# Patient Record
Sex: FEMALE | Race: OTHER
[De-identification: names, ages, dates, MRNs, and addresses within clinical notes are randomized per-mention and may not be internally consistent; named-entity substitution may affect disease eponyms.]

---

## 2017-04-15 ENCOUNTER — HOSPITAL ENCOUNTER (EMERGENCY)
Dept: HOSPITAL 8 - ED | Age: 22
End: 2017-04-15
Payer: SELF-PAY

## 2017-04-15 ENCOUNTER — HOSPITAL ENCOUNTER (EMERGENCY)
Dept: HOSPITAL 8 - ED | Age: 22
Discharge: HOME | End: 2017-04-15
Payer: COMMERCIAL

## 2017-04-15 VITALS — DIASTOLIC BLOOD PRESSURE: 54 MMHG | SYSTOLIC BLOOD PRESSURE: 110 MMHG

## 2017-04-15 VITALS — DIASTOLIC BLOOD PRESSURE: 74 MMHG | SYSTOLIC BLOOD PRESSURE: 109 MMHG

## 2017-04-15 VITALS — WEIGHT: 136.91 LBS | HEIGHT: 65 IN | BODY MASS INDEX: 22.81 KG/M2
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
## 2017-04-15 DIAGNOSIS — S16.1XXA: Primary | ICD-10-CM
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
## 2017-04-15 DIAGNOSIS — M54.2: Primary | ICD-10-CM

## 2017-04-15 PROCEDURE — 72050 X-RAY EXAM NECK SPINE 4/5VWS: CPT

## 2017-04-15 PROCEDURE — 99284 EMERGENCY DEPT VISIT MOD MDM: CPT

## 2017-04-15 PROCEDURE — 99283 EMERGENCY DEPT VISIT LOW MDM: CPT

## 2017-04-15 PROCEDURE — 96372 THER/PROPH/DIAG INJ SC/IM: CPT

## 2017-10-17 ENCOUNTER — NON-PROVIDER VISIT (OUTPATIENT)
Dept: URGENT CARE | Facility: CLINIC | Age: 22
End: 2017-10-17

## 2017-10-17 DIAGNOSIS — Z11.1 PPD SCREENING TEST: ICD-10-CM

## 2017-10-17 PROCEDURE — 86580 TB INTRADERMAL TEST: CPT | Performed by: PHYSICIAN ASSISTANT

## 2017-10-19 ENCOUNTER — NON-PROVIDER VISIT (OUTPATIENT)
Dept: URGENT CARE | Facility: CLINIC | Age: 22
End: 2017-10-19

## 2017-10-19 LAB — TB WHEAL 3D P 5 TU DIAM: NORMAL MM

## 2019-10-07 ENCOUNTER — NON-PROVIDER VISIT (OUTPATIENT)
Dept: OCCUPATIONAL MEDICINE | Facility: CLINIC | Age: 24
End: 2019-10-07

## 2019-10-07 DIAGNOSIS — Z11.1 PPD SCREENING TEST: ICD-10-CM

## 2019-10-07 PROCEDURE — 86580 TB INTRADERMAL TEST: CPT | Performed by: PHYSICIAN ASSISTANT

## 2019-10-09 ENCOUNTER — NON-PROVIDER VISIT (OUTPATIENT)
Dept: OCCUPATIONAL MEDICINE | Facility: CLINIC | Age: 24
End: 2019-10-09

## 2019-10-09 LAB — TB WHEAL 3D P 5 TU DIAM: NORMAL MM

## 2020-04-22 ENCOUNTER — HOSPITAL ENCOUNTER (EMERGENCY)
Dept: HOSPITAL 8 - ED | Age: 25
Discharge: HOME | End: 2020-04-22
Payer: MEDICAID

## 2020-04-22 VITALS — BODY MASS INDEX: 29.21 KG/M2 | WEIGHT: 158.73 LBS | HEIGHT: 62 IN

## 2020-04-22 VITALS — DIASTOLIC BLOOD PRESSURE: 76 MMHG | SYSTOLIC BLOOD PRESSURE: 117 MMHG

## 2020-04-22 DIAGNOSIS — Z20.828: ICD-10-CM

## 2020-04-22 DIAGNOSIS — R07.89: ICD-10-CM

## 2020-04-22 DIAGNOSIS — J00: Primary | ICD-10-CM

## 2020-04-22 DIAGNOSIS — R50.9: ICD-10-CM

## 2020-04-22 LAB
ALBUMIN SERPL-MCNC: 3.8 G/DL (ref 3.4–5)
ANION GAP SERPL CALC-SCNC: 4 MMOL/L (ref 5–15)
BASOPHILS # BLD AUTO: 0.05 X10^3/UL (ref 0–0.1)
BASOPHILS NFR BLD AUTO: 1 % (ref 0–1)
CALCIUM SERPL-MCNC: 9 MG/DL (ref 8.5–10.1)
CHLORIDE SERPL-SCNC: 109 MMOL/L (ref 98–107)
CREAT SERPL-MCNC: 0.62 MG/DL (ref 0.55–1.02)
EOSINOPHIL # BLD AUTO: 0.18 X10^3/UL (ref 0–0.4)
EOSINOPHIL NFR BLD AUTO: 2 % (ref 1–7)
ERYTHROCYTE [DISTWIDTH] IN BLOOD BY AUTOMATED COUNT: 12.9 % (ref 9.6–15.2)
LYMPHOCYTES # BLD AUTO: 2.33 X10^3/UL (ref 1–3.4)
LYMPHOCYTES NFR BLD AUTO: 24 % (ref 22–44)
MCH RBC QN AUTO: 30.3 PG (ref 27–34.8)
MCHC RBC AUTO-ENTMCNC: 33.9 G/DL (ref 32.4–35.8)
MCV RBC AUTO: 89.2 FL (ref 80–100)
MD: NO
MONOCYTES # BLD AUTO: 0.62 X10^3/UL (ref 0.2–0.8)
MONOCYTES NFR BLD AUTO: 7 % (ref 2–9)
NEUTROPHILS # BLD AUTO: 6.39 X10^3/UL (ref 1.8–6.8)
NEUTROPHILS NFR BLD AUTO: 67 % (ref 42–75)
PLATELET # BLD AUTO: 280 X10^3/UL (ref 130–400)
PMV BLD AUTO: 9.3 FL (ref 7.4–10.4)
RBC # BLD AUTO: 4.79 X10^6/UL (ref 3.82–5.3)

## 2020-04-22 PROCEDURE — 71045 X-RAY EXAM CHEST 1 VIEW: CPT

## 2020-04-22 PROCEDURE — 99285 EMERGENCY DEPT VISIT HI MDM: CPT

## 2020-04-22 PROCEDURE — 85025 COMPLETE CBC W/AUTO DIFF WBC: CPT

## 2020-04-22 PROCEDURE — 80048 BASIC METABOLIC PNL TOTAL CA: CPT

## 2020-04-22 PROCEDURE — 82040 ASSAY OF SERUM ALBUMIN: CPT

## 2020-04-22 PROCEDURE — 93005 ELECTROCARDIOGRAM TRACING: CPT

## 2020-04-22 PROCEDURE — 36415 COLL VENOUS BLD VENIPUNCTURE: CPT

## 2020-04-22 NOTE — NUR
THIS IS A 23 YO FEMALE COMING IN FOR "TIGHTNESS IN MY CHEST AND IT'S A LITTLE 
HARD TO CATCH MY BREATH". PATIENT STATES THESE SYMPTOMS STARTED YESTERDAY, THE 
PAST WEEK THOUGH HAS BEEN FEELING "FEVERY AND I HAD THE CHILLS AND A SORE 
THROAT". PATIENT DENIES ANY MEDICAL HX. LUNG SOUNDS CLEAR BUT MILDLY DIMINISHED 
THROUGHOUT. SPO2 AT 98% ON RA. NSR ON  CARDIAC MONITOR. ALL MONITORING IN 
PLACE. CALL LIGHT IN REACH

## 2020-04-22 NOTE — NUR
-------------------------------------------------------------------------------

          *** Note undone in [facility] - 04/22/20 at 2015 by JOSIAH ***          

-------------------------------------------------------------------------------

REENA HILL

## 2020-10-26 ENCOUNTER — HOSPITAL ENCOUNTER (EMERGENCY)
Dept: HOSPITAL 8 - ED | Age: 25
Discharge: HOME | End: 2020-10-26
Payer: MEDICAID

## 2020-10-26 VITALS — WEIGHT: 155.21 LBS | BODY MASS INDEX: 30.47 KG/M2 | HEIGHT: 60 IN

## 2020-10-26 VITALS — DIASTOLIC BLOOD PRESSURE: 65 MMHG | SYSTOLIC BLOOD PRESSURE: 112 MMHG

## 2020-10-26 DIAGNOSIS — R00.0: ICD-10-CM

## 2020-10-26 DIAGNOSIS — B34.9: ICD-10-CM

## 2020-10-26 DIAGNOSIS — R94.31: ICD-10-CM

## 2020-10-26 DIAGNOSIS — U07.1: Primary | ICD-10-CM

## 2020-10-26 LAB
ALBUMIN SERPL-MCNC: 3.9 G/DL (ref 3.4–5)
ANION GAP SERPL CALC-SCNC: 9 MMOL/L (ref 5–15)
BASOPHILS # BLD AUTO: 0 X10^3/UL (ref 0–0.1)
BASOPHILS NFR BLD AUTO: 1 % (ref 0–1)
CALCIUM SERPL-MCNC: 8.5 MG/DL (ref 8.5–10.1)
CHLORIDE SERPL-SCNC: 106 MMOL/L (ref 98–107)
CREAT SERPL-MCNC: 0.65 MG/DL (ref 0.55–1.02)
EOSINOPHIL # BLD AUTO: 0 X10^3/UL (ref 0–0.4)
EOSINOPHIL NFR BLD AUTO: 0 % (ref 1–7)
ERYTHROCYTE [DISTWIDTH] IN BLOOD BY AUTOMATED COUNT: 12.8 % (ref 9.6–15.2)
LYMPHOCYTES # BLD AUTO: 1 X10^3/UL (ref 1–3.4)
LYMPHOCYTES NFR BLD AUTO: 18 % (ref 22–44)
MCH RBC QN AUTO: 29.5 PG (ref 27–34.8)
MCHC RBC AUTO-ENTMCNC: 33.6 G/DL (ref 32.4–35.8)
MD: NO
MICROSCOPIC: (no result)
MONOCYTES # BLD AUTO: 0.4 X10^3/UL (ref 0.2–0.8)
MONOCYTES NFR BLD AUTO: 7 % (ref 2–9)
NEUTROPHILS # BLD AUTO: 4.1 X10^3/UL (ref 1.8–6.8)
NEUTROPHILS NFR BLD AUTO: 75 % (ref 42–75)
PLATELET # BLD AUTO: 174 X10^3/UL (ref 130–400)
PMV BLD AUTO: 9.3 FL (ref 7.4–10.4)
RBC # BLD AUTO: 4.94 X10^6/UL (ref 3.82–5.3)

## 2020-10-26 PROCEDURE — 85025 COMPLETE CBC W/AUTO DIFF WBC: CPT

## 2020-10-26 PROCEDURE — 87086 URINE CULTURE/COLONY COUNT: CPT

## 2020-10-26 PROCEDURE — 36415 COLL VENOUS BLD VENIPUNCTURE: CPT

## 2020-10-26 PROCEDURE — 80048 BASIC METABOLIC PNL TOTAL CA: CPT

## 2020-10-26 PROCEDURE — 96361 HYDRATE IV INFUSION ADD-ON: CPT

## 2020-10-26 PROCEDURE — 87635 SARS-COV-2 COVID-19 AMP PRB: CPT

## 2020-10-26 PROCEDURE — 84145 PROCALCITONIN (PCT): CPT

## 2020-10-26 PROCEDURE — 82040 ASSAY OF SERUM ALBUMIN: CPT

## 2020-10-26 PROCEDURE — 81001 URINALYSIS AUTO W/SCOPE: CPT

## 2020-10-26 PROCEDURE — 71045 X-RAY EXAM CHEST 1 VIEW: CPT

## 2020-10-26 PROCEDURE — 93005 ELECTROCARDIOGRAM TRACING: CPT

## 2020-10-26 PROCEDURE — 83605 ASSAY OF LACTIC ACID: CPT

## 2020-10-26 PROCEDURE — 96374 THER/PROPH/DIAG INJ IV PUSH: CPT

## 2020-10-26 PROCEDURE — 99285 EMERGENCY DEPT VISIT HI MDM: CPT

## 2020-10-26 PROCEDURE — 87040 BLOOD CULTURE FOR BACTERIA: CPT

## 2020-10-27 ENCOUNTER — HOSPITAL ENCOUNTER (EMERGENCY)
Dept: HOSPITAL 8 - ED | Age: 25
LOS: 1 days | Discharge: HOME | End: 2020-10-28
Payer: MEDICAID

## 2020-10-27 VITALS — BODY MASS INDEX: 30.04 KG/M2 | HEIGHT: 60 IN | WEIGHT: 153 LBS

## 2020-10-27 VITALS — SYSTOLIC BLOOD PRESSURE: 109 MMHG | DIASTOLIC BLOOD PRESSURE: 63 MMHG

## 2020-10-27 DIAGNOSIS — R00.0: ICD-10-CM

## 2020-10-27 DIAGNOSIS — R07.89: ICD-10-CM

## 2020-10-27 DIAGNOSIS — J15.9: Primary | ICD-10-CM

## 2020-10-27 DIAGNOSIS — R06.02: ICD-10-CM

## 2020-10-27 DIAGNOSIS — J06.9: ICD-10-CM

## 2020-10-27 PROCEDURE — 93005 ELECTROCARDIOGRAM TRACING: CPT

## 2020-10-27 PROCEDURE — 87400 INFLUENZA A/B EACH AG IA: CPT

## 2020-10-27 PROCEDURE — 99285 EMERGENCY DEPT VISIT HI MDM: CPT

## 2020-10-27 PROCEDURE — 71045 X-RAY EXAM CHEST 1 VIEW: CPT

## 2021-02-02 ENCOUNTER — HOSPITAL ENCOUNTER (EMERGENCY)
Dept: HOSPITAL 8 - ED | Age: 26
Discharge: HOME | End: 2021-02-02
Payer: MEDICAID

## 2021-02-02 VITALS — HEIGHT: 64 IN | BODY MASS INDEX: 26.99 KG/M2 | WEIGHT: 158.07 LBS

## 2021-02-02 VITALS — DIASTOLIC BLOOD PRESSURE: 78 MMHG | SYSTOLIC BLOOD PRESSURE: 128 MMHG

## 2021-02-02 DIAGNOSIS — M54.12: Primary | ICD-10-CM

## 2021-02-02 DIAGNOSIS — M25.531: ICD-10-CM

## 2021-02-02 DIAGNOSIS — M25.521: ICD-10-CM

## 2021-02-02 DIAGNOSIS — M25.511: ICD-10-CM

## 2021-02-02 DIAGNOSIS — M79.631: ICD-10-CM

## 2021-02-02 PROCEDURE — 99283 EMERGENCY DEPT VISIT LOW MDM: CPT

## 2021-02-02 NOTE — NUR
pt states tingling up and down right arm. pt states tingling started in hand 
and overnight it was the whole arm. denies headache, neck pain. states having 
some shoulder pain.

## 2021-03-04 ENCOUNTER — HOSPITAL ENCOUNTER (OUTPATIENT)
Dept: LAB | Facility: MEDICAL CENTER | Age: 26
End: 2021-03-04
Attending: INTERNAL MEDICINE
Payer: MEDICAID

## 2021-03-04 DIAGNOSIS — R53.83 OTHER FATIGUE: ICD-10-CM

## 2021-03-04 DIAGNOSIS — M25.50 POLYARTHRALGIA: ICD-10-CM

## 2021-03-04 DIAGNOSIS — R76.8 POSITIVE ANA (ANTINUCLEAR ANTIBODY): ICD-10-CM

## 2021-03-04 LAB
25(OH)D3 SERPL-MCNC: 18 NG/ML (ref 30–100)
ALBUMIN SERPL BCP-MCNC: 4.7 G/DL (ref 3.2–4.9)
ALBUMIN/GLOB SERPL: 1.5 G/DL
ALP SERPL-CCNC: 101 U/L (ref 30–99)
ALT SERPL-CCNC: 21 U/L (ref 2–50)
ANION GAP SERPL CALC-SCNC: 10 MMOL/L (ref 7–16)
AST SERPL-CCNC: 23 U/L (ref 12–45)
BASOPHILS # BLD AUTO: 0.4 % (ref 0–1.8)
BASOPHILS # BLD: 0.03 K/UL (ref 0–0.12)
BILIRUB SERPL-MCNC: 0.5 MG/DL (ref 0.1–1.5)
BUN SERPL-MCNC: 9 MG/DL (ref 8–22)
C3 SERPL-MCNC: 159.5 MG/DL (ref 87–200)
C4 SERPL-MCNC: 20.6 MG/DL (ref 19–52)
CALCIUM SERPL-MCNC: 9.7 MG/DL (ref 8.5–10.5)
CHLORIDE SERPL-SCNC: 108 MMOL/L (ref 96–112)
CO2 SERPL-SCNC: 24 MMOL/L (ref 20–33)
CREAT SERPL-MCNC: 0.5 MG/DL (ref 0.5–1.4)
CRP SERPL HS-MCNC: 0.2 MG/DL (ref 0–0.75)
EOSINOPHIL # BLD AUTO: 0.11 K/UL (ref 0–0.51)
EOSINOPHIL NFR BLD: 1.5 % (ref 0–6.9)
ERYTHROCYTE [DISTWIDTH] IN BLOOD BY AUTOMATED COUNT: 40.8 FL (ref 35.9–50)
ERYTHROCYTE [SEDIMENTATION RATE] IN BLOOD BY WESTERGREN METHOD: 13 MM/HOUR (ref 0–20)
GLOBULIN SER CALC-MCNC: 3.1 G/DL (ref 1.9–3.5)
GLUCOSE SERPL-MCNC: 94 MG/DL (ref 65–99)
HCT VFR BLD AUTO: 46.6 % (ref 37–47)
HGB BLD-MCNC: 15.6 G/DL (ref 12–16)
IMM GRANULOCYTES # BLD AUTO: 0.01 K/UL (ref 0–0.11)
IMM GRANULOCYTES NFR BLD AUTO: 0.1 % (ref 0–0.9)
LYMPHOCYTES # BLD AUTO: 1.47 K/UL (ref 1–4.8)
LYMPHOCYTES NFR BLD: 20.3 % (ref 22–41)
MCH RBC QN AUTO: 29.8 PG (ref 27–33)
MCHC RBC AUTO-ENTMCNC: 33.5 G/DL (ref 33.6–35)
MCV RBC AUTO: 88.9 FL (ref 81.4–97.8)
MONOCYTES # BLD AUTO: 0.39 K/UL (ref 0–0.85)
MONOCYTES NFR BLD AUTO: 5.4 % (ref 0–13.4)
NEUTROPHILS # BLD AUTO: 5.23 K/UL (ref 2–7.15)
NEUTROPHILS NFR BLD: 72.3 % (ref 44–72)
NRBC # BLD AUTO: 0 K/UL
NRBC BLD-RTO: 0 /100 WBC
PLATELET # BLD AUTO: 303 K/UL (ref 164–446)
PMV BLD AUTO: 11.1 FL (ref 9–12.9)
POTASSIUM SERPL-SCNC: 3.8 MMOL/L (ref 3.6–5.5)
PROT SERPL-MCNC: 7.8 G/DL (ref 6–8.2)
RBC # BLD AUTO: 5.24 M/UL (ref 4.2–5.4)
SODIUM SERPL-SCNC: 142 MMOL/L (ref 135–145)
THYROPEROXIDASE AB SERPL-ACNC: <9 IU/ML (ref 0–9)
VIT B12 SERPL-MCNC: 637 PG/ML (ref 211–911)
WBC # BLD AUTO: 7.2 K/UL (ref 4.8–10.8)

## 2021-03-04 PROCEDURE — 80053 COMPREHEN METABOLIC PANEL: CPT

## 2021-03-04 PROCEDURE — 86038 ANTINUCLEAR ANTIBODIES: CPT

## 2021-03-04 PROCEDURE — 86140 C-REACTIVE PROTEIN: CPT

## 2021-03-04 PROCEDURE — 36415 COLL VENOUS BLD VENIPUNCTURE: CPT

## 2021-03-04 PROCEDURE — 86225 DNA ANTIBODY NATIVE: CPT

## 2021-03-04 PROCEDURE — 86800 THYROGLOBULIN ANTIBODY: CPT

## 2021-03-04 PROCEDURE — 82607 VITAMIN B-12: CPT

## 2021-03-04 PROCEDURE — 82306 VITAMIN D 25 HYDROXY: CPT

## 2021-03-04 PROCEDURE — 85652 RBC SED RATE AUTOMATED: CPT

## 2021-03-04 PROCEDURE — 83516 IMMUNOASSAY NONANTIBODY: CPT

## 2021-03-04 PROCEDURE — 86160 COMPLEMENT ANTIGEN: CPT

## 2021-03-04 PROCEDURE — 86376 MICROSOMAL ANTIBODY EACH: CPT

## 2021-03-04 PROCEDURE — 86812 HLA TYPING A B OR C: CPT

## 2021-03-04 PROCEDURE — 85025 COMPLETE CBC W/AUTO DIFF WBC: CPT

## 2021-03-04 PROCEDURE — 86200 CCP ANTIBODY: CPT

## 2021-03-06 LAB
CCP IGG SERPL-ACNC: 3 UNITS (ref 0–19)
DSDNA AB TITR SER CLIF: NORMAL {TITER}
HLA-B27 QL FC: NEGATIVE
NUCLEAR IGG SER QL IA: NORMAL
THYROGLOB AB SERPL-ACNC: <0.9 IU/ML (ref 0–4)
THYROPEROXIDASE AB SERPL-ACNC: 0.7 IU/ML (ref 0–9)

## 2021-03-08 LAB — CHROMATIN IGG SERPL-ACNC: 4 UNITS (ref 0–19)

## 2023-05-12 ENCOUNTER — OFFICE VISIT (OUTPATIENT)
Dept: RHEUMATOLOGY | Facility: MEDICAL CENTER | Age: 28
End: 2023-05-12
Attending: STUDENT IN AN ORGANIZED HEALTH CARE EDUCATION/TRAINING PROGRAM
Payer: COMMERCIAL

## 2023-05-12 VITALS
SYSTOLIC BLOOD PRESSURE: 122 MMHG | HEART RATE: 69 BPM | TEMPERATURE: 99.1 F | BODY MASS INDEX: 31.22 KG/M2 | OXYGEN SATURATION: 96 % | HEIGHT: 60 IN | DIASTOLIC BLOOD PRESSURE: 72 MMHG | WEIGHT: 159 LBS

## 2023-05-12 DIAGNOSIS — L40.0 PLAQUE PSORIASIS: ICD-10-CM

## 2023-05-12 DIAGNOSIS — L40.50 PSORIATIC ARTHRITIS (HCC): ICD-10-CM

## 2023-05-12 DIAGNOSIS — R76.8 SEROLOGIC AUTOIMMUNITY: ICD-10-CM

## 2023-05-12 DIAGNOSIS — R23.1 LIVEDO RETICULARIS WITHOUT ULCERATION: ICD-10-CM

## 2023-05-12 DIAGNOSIS — D84.9 IMMUNOSUPPRESSED STATUS (HCC): ICD-10-CM

## 2023-05-12 PROCEDURE — 3074F SYST BP LT 130 MM HG: CPT | Performed by: STUDENT IN AN ORGANIZED HEALTH CARE EDUCATION/TRAINING PROGRAM

## 2023-05-12 PROCEDURE — 3078F DIAST BP <80 MM HG: CPT | Performed by: STUDENT IN AN ORGANIZED HEALTH CARE EDUCATION/TRAINING PROGRAM

## 2023-05-12 PROCEDURE — 1125F AMNT PAIN NOTED PAIN PRSNT: CPT | Performed by: STUDENT IN AN ORGANIZED HEALTH CARE EDUCATION/TRAINING PROGRAM

## 2023-05-12 PROCEDURE — 99204 OFFICE O/P NEW MOD 45 MIN: CPT | Performed by: STUDENT IN AN ORGANIZED HEALTH CARE EDUCATION/TRAINING PROGRAM

## 2023-05-12 PROCEDURE — 99211 OFF/OP EST MAY X REQ PHY/QHP: CPT | Performed by: STUDENT IN AN ORGANIZED HEALTH CARE EDUCATION/TRAINING PROGRAM

## 2023-05-12 RX ORDER — CERTOLIZUMAB PEGOL 200 MG/ML
INJECTION, SOLUTION SUBCUTANEOUS
COMMUNITY

## 2023-05-12 RX ORDER — ONDANSETRON 2 MG/ML
INJECTION INTRAMUSCULAR; INTRAVENOUS
COMMUNITY
End: 2023-05-12

## 2023-05-12 ASSESSMENT — PATIENT HEALTH QUESTIONNAIRE - PHQ9
SUM OF ALL RESPONSES TO PHQ QUESTIONS 1-9: 5
5. POOR APPETITE OR OVEREATING: 0 - NOT AT ALL
CLINICAL INTERPRETATION OF PHQ2 SCORE: 2

## 2023-05-12 NOTE — PROGRESS NOTES
[facility] RHEUMATOLOGY  [address] [place] [address], [address], [place] [address]  Phone: [phone] ? Fax: [phone]    RHEUMATOLOGY NEW PATIENT VISIT NOTE      DATE OF SERVICE: 05/12/2023         Subjective     REFERRING PRACTITIONER:  Irene Hedrick M.D.  [address] [place] [address]    PATIENT IDENTIFICATION:  Abril Gutierrez  [address]  [place] [address]    YOB: 1995    MEDICAL RECORD NUMBER: [id number]         CHIEF COMPLAINT:   Chief Complaint   Patient presents with    New Patient     Psoriatic arthritis and positive FARHANA       HISTORY OF PRESENT ILLNESS:  Abril Gutierrez is a 27 y.o. female with pertinent history notable for psoriatic arthritis diagnosed in late 2022 (reportedly symptmatic since early 2021), plaque psoriasis since teenage years, hyperhidrosis since childhood, anxiety and depression. Presents to establish care for rheumatologic evaluation and management of her PsA in the setting of positive FARHANA with concern for other connective tissue disease. Reports variable course of symptoms including episodic pain/swelling of her hands, variable durations of morning stiffness, muscle pain with body aches, cold-induced color changes of fingers (turns red), and chronic sweaty palms and armpits due to her hyperhidrosis. She previously managed with analgesics until she was started on Cimzia which has been helpful for her skin and joints.    Pertinent treatment history: Cimzia 400 mg SC every 2 weeks (1/2023-present, effective).    Pertinent laboratory results: Positive FARHANA with anti-dsDNA 13, and low vitamin D of 22 (in 12/2022); negative/normal HLA-B27, anti-TPO, anti-TG, anti-chromatin, C3, C4, ESR and CRP (in 3/2021), QTB (in 1/2022), TSH, CBC, and CMP (in 12/2022).    Pertinent XR imaging in 4/2021: Hands and feet with no radiographic evidence of inflammatory or degenerative arthropathy.    REVIEW OF SYSTEMS:  Except as noted in the history above, relevant review of systems with emphasis on  autoimmune rheumatic diseases was otherwise negative.      ACTIVE PROBLEM LIST:  Patient Active Problem List    Diagnosis Date Noted    Psoriatic arthritis (HCC) 05/12/2023    Plaque psoriasis 05/12/2023    Livedo reticularis without ulceration 05/12/2023    Serologic autoimmunity (positive FARHANA) 05/12/2023    Immunosuppressed status (HCC) 05/12/2023       PAST MEDICAL HISTORY:  Past Medical History:   Diagnosis Date    Anxiety     Depression        PAST SURGICAL HISTORY:  Past Surgical History:   Procedure Laterality Date    APPENDECTOMY  2010       MEDICATIONS:  Current Outpatient Medications   Medication Sig    certolizumab pegol (CIMZIA STARTER KIT) 6 X 200 MG/ML Prefilled Syringe Kit Inject  under the skin.       ALLERGIES:   No Known Allergies    IMMUNIZATIONS:  Immunization History   Administered Date(s) Administered    Dtap Vaccine 1995, 01/13/1998, 03/13/1998, 12/02/1998, 09/06/2001    HIB Vaccine (ACTHIB/HIBERIX) 12/02/1998, 02/02/1999    Hepatitis A Vaccine, Ped/Adol 02/13/2002, 08/15/2002    Hepatitis B Vaccine Adolescent/Pediatric 12/02/1998, 02/02/1999, 06/02/1999    IPV 09/06/2001    MMR Vaccine 12/02/1998, 09/06/2001    Measles Vaccine - Historical Data 10/06/1998    OPV TRIVALENT - HISTORICAL DATA (GIVEN PRIOR TO MAY 2016) 1995, 01/13/1998, 03/13/1998    Tuberculin Skin Test 09/05/2016, 10/17/2017, 10/07/2019    Varicella Vaccine Live 02/11/2000       SOCIAL HISTORY:   Social History     Socioeconomic History    Marital status: Single   Tobacco Use    Smoking status: Never    Smokeless tobacco: Never   Vaping Use    Vaping Use: Never used   Substance and Sexual Activity    Alcohol use: Yes    Drug use: No       FAMILY HISTORY:  History reviewed. No pertinent family history.         Objective     Vital Signs: /72 (BP Location: Left arm, Patient Position: Sitting, BP Cuff Size: Adult)   Pulse 69   Temp 37.3 °C (99.1 °F) (Temporal)   Ht 1.524 m (5')   Wt 72.1 kg (159 lb)   SpO2  96% Body mass index is 31.05 kg/m².    General: Appears well and comfortable  Eyes: No scleral or conjunctival lesions  ENT: No apparent oral or nasal lesions  Head/Neck: Psoriasis plaques on the edge of scalp mostly on the occipital region  Cardiovascular: Regular rate and rhythm; no pericardial rubs  Respiratory: Breathing quiet and unlabored; no rales or pleural rubs  Gastrointestinal: No apparent organomegaly or abdominal masses  Integumentary: Diffuse livedo reticularis on extremities (lower more than upper); wet palms due to hyperhidrosis  Musculoskeletal: No significant joint tenderness, periarticular soft tissue swelling, warmth, erythema, or overt signs of synovitis; no significant restriction in range of motion of joints examined  Neurologic: No focal sensory or motor deficits  Psychiatric: Mood and affect appropriate      LABORATORY RESULTS REVIEWED AND INTERPRETED BY ME:  Lab Results   Component Value Date/Time    SEDRATEWES 13 03/04/2021 10:48 AM    CREACTPROT 0.20 03/04/2021 10:48 AM     Lab Results   Component Value Date/Time    X1UVLFBONRL 159.5 03/04/2021 10:48 AM    Q5EBGTLWFRP 20.6 03/04/2021 10:48 AM     Lab Results   Component Value Date/Time    CCPANTIBODY 3 03/04/2021 10:48 AM    [id number] Negative 03/04/2021 10:49 AM     Lab Results   Component Value Date/Time    ANTINUCAB None Detected 03/04/2021 10:49 AM     Lab Results   Component Value Date/Time    ANTIDNADS None Detected 03/04/2021 10:48 AM     Lab Results   Component Value Date/Time    MICROSOMALA 0.7 03/04/2021 10:48 AM    MICROSOMALA <9.0 03/04/2021 10:48 AM    ANTITHYROGL <0.9 03/04/2021 10:48 AM     Lab Results   Component Value Date/Time    25HYDROXY 18 (L) 03/04/2021 10:48 AM     Lab Results   Component Value Date/Time    PROTHROMBTM 11.7 04/26/2009 12:20 AM    INR 1.02 04/26/2009 12:20 AM     Lab Results   Component Value Date/Time    WBC 7.2 03/04/2021 10:48 AM    RBC 5.24 03/04/2021 10:48 AM    HEMOGLOBIN 15.6 03/04/2021  10:48 AM    HEMATOCRIT 46.6 03/04/2021 10:48 AM    MCV 88.9 03/04/2021 10:48 AM    MCH 29.8 03/04/2021 10:48 AM    MCHC 33.5 (L) 03/04/2021 10:48 AM    RDW 40.8 03/04/2021 10:48 AM    PLATELETCT 303 03/04/2021 10:48 AM    MPV 11.1 03/04/2021 10:48 AM    NEUTS 5.23 03/04/2021 10:48 AM    LYMPHOCYTES 20.30 (L) 03/04/2021 10:48 AM    MONOCYTES 5.40 03/04/2021 10:48 AM    EOSINOPHILS 1.50 03/04/2021 10:48 AM    BASOPHILS 0.40 03/04/2021 10:48 AM    HYPOCHROMIA 1+ 12/10/2012 11:00 AM     Lab Results   Component Value Date/Time    ASTSGOT 20 12/07/2022 07:27 AM    ASTSGOT 23 03/04/2021 10:48 AM    ALTSGPT 25 12/07/2022 07:27 AM    ALTSGPT 21 03/04/2021 10:48 AM    ALKPHOSPHAT 88 12/07/2022 07:27 AM    ALKPHOSPHAT 101 (H) 03/04/2021 10:48 AM    TBILIRUBIN 0.7 12/07/2022 07:27 AM    TBILIRUBIN 0.5 03/04/2021 10:48 AM    TOTPROTEIN 7.2 12/07/2022 07:27 AM    TOTPROTEIN 7.8 03/04/2021 10:48 AM    ALBUMIN 4.6 12/07/2022 07:27 AM    ALBUMIN 4.7 03/04/2021 10:48 AM     Lab Results   Component Value Date/Time    SODIUM 140 12/07/2022 07:27 AM    SODIUM 142 03/04/2021 10:48 AM    POTASSIUM 4.0 12/07/2022 07:27 AM    POTASSIUM 3.8 03/04/2021 10:48 AM    CHLORIDE 106 12/07/2022 07:27 AM    CHLORIDE 108 03/04/2021 10:48 AM    CO2 24.0 12/07/2022 07:27 AM    CO2 24 03/04/2021 10:48 AM    GLUCOSE 104 12/07/2022 07:27 AM    GLUCOSE 94 03/04/2021 10:48 AM    BUN 9.0 12/07/2022 07:27 AM    BUN 9 03/04/2021 10:48 AM    CREATININE 0.7 12/07/2022 07:27 AM    CREATININE 0.50 03/04/2021 10:48 AM    CREATININE 0.5 04/26/2009 12:20 AM    BUNCREATRAT 12.9 12/07/2022 07:27 AM    CALCIUM 9.5 12/07/2022 07:27 AM    CALCIUM 9.7 03/04/2021 10:48 AM     Lab Results   Component Value Date/Time    COLORURINE Lt. Yellow 12/10/2012 09:20 AM    SPECGRAVITY 1.016 12/10/2012 09:20 AM    PHURINE 5.5 12/10/2012 09:20 AM    GLUCOSEUR Negative 12/10/2012 09:20 AM    KETONES Negative 12/10/2012 09:20 AM    PROTEINURIN Negative 12/10/2012 09:20 AM     Lab Results    Component Value Date/Time    HBA1C 5.4 12/07/2022 07:27 AM       RADIOLOGY RESULTS REVIEWED AND INTERPRETED BY ME:  Results for orders placed during the hospital encounter of 04/07/21    DX-JOINT SURVEY-FEET SINGLE VIEW    Impression  Unremarkable bilateral PA view if the feet. No radiographic evidence for active inflammatory arthritis at this time.    This exam was performed in an orthopedic clinic of the [facility]. This interpretation is performed in addition to the interpretation by the ordering provider.    Results for orders placed during the hospital encounter of 04/07/21    DX-JOINT SURVEY-HANDS SINGLE VIEW    Impression  Unremarkable examination of hands. No radiographic evidence for active inflammatory arthritis at this time.    This exam was performed in an orthopedic clinic of the [facility]. This interpretation is performed in addition to the interpretation by the ordering provider.      All relevant laboratory and imaging results reported on this note were reviewed and interpreted by me.         Assessment & Plan     Abril Gutierrez is a 27 y.o. female with history as noted above whose presentation merits the following diagnostic and clinical status impressions and recommendations:    1. Serologic autoimmunity (positive FARHANA)  Serologic evidence of immunologic activity without objective clinical evidence of an underlying FARHANA-associated autoimmune disease, such as Sjogren syndrome, systemic lupus, inflammatory myopathy, or systemic sclerosis. Notably, the FARHANA test is highly sensitive and lacks diagnostic specificity as it can be seen in a variety of non-rheumatic conditions, such as acute or chronic viral or bacterial infections, autoimmune thyroid disease (Hashimoto's thyroiditis or Graves' disease), autoimmune hepatobiliary disease, inflammatory bowel disease, and lymphoproliferative disease or malignancy, as well as in over 10% of healthy individuals with no clinical evidence of  autoimmune rheumatic disease. In any case, it must be interpreted in the context of the overall clinical picture with close attention to disease-specific manifestations and disease-specific sub-antibodies. That said, the presence of persistently positive FARHANA, especially in high or rising titers, does confer some risk of FARHANA-associated disease, so if suspicious symptoms develop and persist, clinical follow-up for re-evaluation would be reasonable. For now, reasonable to undertake the additional workup noted below for confirmatory, exclusionary, and risk stratification purposes.  - FARHANA REFLEXIVE PROFILE; Future  - ANTI-HISTONE ABS; Future  - CHROMATIN AB,IGG; Future  - COMPLEMENT C3; Future  - COMPLEMENT C4; Future    2. 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 Psoriatic arthritis (HCC)  Clinically relatively low disease activity that appears to be well-controlled on the current regimen of Cimzia, so no need for modification of treatment.  - Continue Cimzia 400 mg SC every 2 weeks    4. Plaque psoriasis  Clinically appears to be responsive to Cimzia, though her peripheral scalp plaques are not completely cleared.  - Continue Cimzia 400 mg SC every 2 weeks    5. Immunosuppressed status (HCC)  Presently with no reported history, physical exam, or laboratory evidence to suggest significant adverse drug effects or opportunistic infections, but need routine monitoring per guidelines.  - Need to ascertain age-appropriate vaccines to include Shingrix      Note: The above assessment and plan were discussed with the patient who acknowledged understanding of the plan.    FOLLOW-UP: Return in about 2 months (around 7/12/2023) for Short.         Thank you for the opportunity to participate in the care of Abril Gutierrez.    John Goodwin MD, MS  Rheumatologist, [facility] ?  [facility]   of Clinical Medicine, Department of Internal Medicine  [facility], [employer] [facility]

## 2023-05-12 NOTE — PATIENT INSTRUCTIONS
AFTER VISIT INSTRUCTIONS    Below are important information to help you navigate your healthcare needs and help us serve you safely and effectively:  If laboratory tests and/or imaging studies were ordered, remember to go get them done as instructed.  If new prescriptions and/or refills were sent, remember to go pick them up from your local pharmacy, or call the specialty pharmacy to request shipment.  Always take your prescription medications exactly as prescribed unless instructed otherwise.  Note that antirheumatic drugs and steroids are immunosuppressive which means they increase your risk of infections and have multiple potential adverse effects on various organ systems in your body, though most of them are uncommon.  It is important that you are up-to-date on age-appropriate immunizations, particularly shingles and bacterial/viral pneumonia vaccines, which you can request from me or your primary care provider.  Be sure to read the drug package inserts to learn about the potential side effects of your medications before you start taking them.  If you experience any significant drug side effects, stop taking the medication and notify me promptly, and depending on the severity of the side effects, consider going to an urgent care or emergency department for immediate attention.  If there are significant findings on your lab tests and imaging studies that warrant further action, I will notify you with explanations via [employer] or phone call, otherwise you can view them on [employer] and let me know if you have any questions.  Note that [employer] messages are typically read during office hours and may take 1-7 business days before a response depending on the urgency of the situation and how busy my clinic schedule is.  In general, [employer] messaging is for non-urgent matters that do not require immediate attention, so for urgent matters that cannot wait, you are advised to go to an urgent care.  Lastly, you are granted  MyChart access to my documentation of your visit and are encouraged to read my note which details my assessment and plan for your condition.

## 2023-07-11 ENCOUNTER — HOSPITAL ENCOUNTER (OUTPATIENT)
Dept: LAB | Facility: MEDICAL CENTER | Age: 28
End: 2023-07-11
Attending: STUDENT IN AN ORGANIZED HEALTH CARE EDUCATION/TRAINING PROGRAM
Payer: COMMERCIAL

## 2023-07-11 DIAGNOSIS — R23.1 LIVEDO RETICULARIS WITHOUT ULCERATION: ICD-10-CM

## 2023-07-11 DIAGNOSIS — R76.8 SEROLOGIC AUTOIMMUNITY: ICD-10-CM

## 2023-07-11 LAB
C3 SERPL-MCNC: 124 MG/DL (ref 87–200)
C4 SERPL-MCNC: 14.3 MG/DL (ref 19–52)

## 2023-07-11 PROCEDURE — 86038 ANTINUCLEAR ANTIBODIES: CPT

## 2023-07-11 PROCEDURE — 86225 DNA ANTIBODY NATIVE: CPT

## 2023-07-11 PROCEDURE — 83516 IMMUNOASSAY NONANTIBODY: CPT

## 2023-07-11 PROCEDURE — 86235 NUCLEAR ANTIGEN ANTIBODY: CPT | Mod: 91

## 2023-07-11 PROCEDURE — 86039 ANTINUCLEAR ANTIBODIES (ANA): CPT

## 2023-07-11 PROCEDURE — 86160 COMPLEMENT ANTIGEN: CPT

## 2023-07-11 PROCEDURE — 36415 COLL VENOUS BLD VENIPUNCTURE: CPT

## 2023-07-11 PROCEDURE — 86256 FLUORESCENT ANTIBODY TITER: CPT

## 2023-07-13 ENCOUNTER — APPOINTMENT (OUTPATIENT)
Dept: RHEUMATOLOGY | Facility: MEDICAL CENTER | Age: 28
End: 2023-07-13
Attending: STUDENT IN AN ORGANIZED HEALTH CARE EDUCATION/TRAINING PROGRAM

## 2023-07-14 LAB
CHROMATIN IGG SERPL-ACNC: 23 UNITS (ref 0–19)
HISTONE IGG SER IA-ACNC: 0.7 UNITS (ref 0–0.9)
NUCLEAR IGG SER QL IA: DETECTED

## 2023-07-15 LAB
ANA INTERPRETIVE COMMENT Q5143: ABNORMAL
ANA PATTERN Q5144: ABNORMAL
ANA TITER Q5145: ABNORMAL
ANTINUCLEAR ANTIBODY (ANA), HEP-2, IGG Q5142: DETECTED

## 2023-07-16 LAB
DSDNA AB TITR SER CLIF: NORMAL {TITER}
ENA JO1 AB TITR SER: 1 AU/ML (ref 0–40)
ENA SCL70 IGG SER QL: 2 AU/ML (ref 0–40)
ENA SM IGG SER-ACNC: 2 AU/ML (ref 0–40)
ENA SS-B IGG SER IA-ACNC: 1 AU/ML (ref 0–40)
SSA52 R0ENA AB IGG Q0420: 6 AU/ML (ref 0–40)
SSA60 R0ENA AB IGG Q0419: 4 AU/ML (ref 0–40)
U1 SNRNP IGG SER QL: 4 UNITS (ref 0–19)

## 2023-07-17 LAB — DSDNA IGG TITR SER CLIF: ABNORMAL {TITER}

## 2023-10-10 ENCOUNTER — OFFICE VISIT (OUTPATIENT)
Dept: RHEUMATOLOGY | Facility: MEDICAL CENTER | Age: 28
End: 2023-10-10
Attending: STUDENT IN AN ORGANIZED HEALTH CARE EDUCATION/TRAINING PROGRAM
Payer: COMMERCIAL

## 2023-10-10 VITALS
TEMPERATURE: 99.1 F | BODY MASS INDEX: 29.64 KG/M2 | OXYGEN SATURATION: 94 % | HEART RATE: 84 BPM | DIASTOLIC BLOOD PRESSURE: 66 MMHG | HEIGHT: 60 IN | WEIGHT: 151 LBS | SYSTOLIC BLOOD PRESSURE: 116 MMHG

## 2023-10-10 DIAGNOSIS — D84.9 IMMUNOSUPPRESSED STATUS (HCC): ICD-10-CM

## 2023-10-10 DIAGNOSIS — L40.50 PSORIATIC ARTHRITIS (HCC): ICD-10-CM

## 2023-10-10 DIAGNOSIS — R76.8 SEROLOGIC AUTOIMMUNITY: ICD-10-CM

## 2023-10-10 DIAGNOSIS — L40.0 PLAQUE PSORIASIS: ICD-10-CM

## 2023-10-10 PROCEDURE — 99214 OFFICE O/P EST MOD 30 MIN: CPT | Performed by: STUDENT IN AN ORGANIZED HEALTH CARE EDUCATION/TRAINING PROGRAM

## 2023-10-10 PROCEDURE — 3078F DIAST BP <80 MM HG: CPT | Performed by: STUDENT IN AN ORGANIZED HEALTH CARE EDUCATION/TRAINING PROGRAM

## 2023-10-10 PROCEDURE — 99211 OFF/OP EST MAY X REQ PHY/QHP: CPT | Performed by: STUDENT IN AN ORGANIZED HEALTH CARE EDUCATION/TRAINING PROGRAM

## 2023-10-10 PROCEDURE — 3074F SYST BP LT 130 MM HG: CPT | Performed by: STUDENT IN AN ORGANIZED HEALTH CARE EDUCATION/TRAINING PROGRAM

## 2023-10-10 RX ORDER — ETONOGESTREL 68 MG/1
IMPLANT SUBCUTANEOUS
COMMUNITY

## 2023-10-10 ASSESSMENT — FIBROSIS 4 INDEX: FIB4 SCORE: 0.48

## 2023-10-10 NOTE — PROGRESS NOTES
[facility] RHEUMATOLOGY  [address], [address], [place] [address]  Phone: [phone] ? Fax: [phone]  Website: [email or web address]    RHEUMATOLOGY FOLLOW-UP VISIT NOTE      DATE OF SERVICE: 10/10/2023         Subjective     PRIMARY CARE PRACTITIONER:  Renetta Black P.A.-C.  [address]  [place] [address]    PATIENT IDENTIFICATION:  Abril Gutierrez  [address]  [place] [address]    YOB: 1995    MEDICAL RECORD NUMBER: [id number]          CHIEF COMPLAINT:   Chief Complaint   Patient presents with    Follow-Up     Serologic autoimmunity (positive FARHANA)       RHEUMATOLOGIC HISTORY:  Abril Gutierrez is a 28 y.o. female with pertinent history notable for psoriatic arthritis diagnosed in late 2022 (reportedly symptmatic since early 2021), plaque psoriasis since teenage years (confirmed on skin biopsy of right occipital scalp in 12/2022), hyperhidrosis since childhood s/p VATS sympathectomy in 10/2023 at [facility], livedo reticularis, anxiety and depression among other comorbidities. She initially presented on 5/12/23 to establish care for rheumatologic evaluation and management of her PsA in the setting of positive FARHANA with concern for other connective tissue disease. Reported variable course of symptoms including episodic pain/swelling of her hands, variable durations of morning stiffness, muscle pain with body aches, cold-induced color changes of fingers (turns red), and chronic sweaty palms and armpits due to her hyperhidrosis. She previously managed with analgesics until she was started on Cimzia which had been helpful for her skin and joints.     Pertinent treatment history: Cimzia 400>200 mg SC every 2 weeks (1/2023-present, effective).     Pertinent laboratory results: Positive FARHANA 1:1280 homogenous pattern, anti-chromatin 23, and anti-dsDNA 119 at 1:80 titer with negative anti-histone (in 7/2023); low C4 of 14.3 with normal C3 of 124 (in 7/2023); low vitamin D  of 22 (in 12/2022); negative/normal HLA-B27, anti-TPO, anti-TG, CRP and ESR (in 3/2021), QTB (in 1/2022), TSH (in 12/2022), CBC and CMP (in 9/2023).    Pertinent XR imaging results: Hands (in 4/2021) and feet (in 4/2021) with no radiographic evidence of inflammatory or degenerative arthropathy.      INTERVAL HISTORY:  Reports interval history as noted on the questionnaire below or scanned under media tab.  Mild episodic joint pain/swelling/stiffness in her hands, wrists, and ankles.  Hyperhidrosis better after VATS sympathectomy surgery on 10/4/23 at [facility].  Doing quite well otherwise and feels that Cimzia has been helpful.    REVIEW OF SYSTEMS:  Except as noted in the history above, relevant review of systems with emphasis on autoimmune rheumatic diseases was otherwise negative.      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    certolizumab pegol (CIMZIA STARTER KIT) 6 X 200 MG/ML Prefilled Syringe Kit Inject  under the skin.       ALLERGIES:   No Known Allergies    IMMUNIZATIONS:  Immunization History   Administered Date(s) Administered    Dtap Vaccine 1995, 01/13/1998, 03/13/1998, 12/02/1998, 09/06/2001    HIB Vaccine (ACTHIB/HIBERIX) 12/02/1998, 02/02/1999    Hepatitis A Vaccine, Ped/Adol 02/13/2002, 08/15/2002    Hepatitis B Vaccine Adolescent/Pediatric 12/02/1998, 02/02/1999,  06/02/1999    IPV 09/06/2001    MMR Vaccine 12/02/1998, 09/06/2001    MODERNA SARS-COV-2 VACCINE (12+) 03/11/2021, 04/08/2021, 11/29/2021    Measles Vaccine - Historical Data 10/06/1998    OPV TRIVALENT - HISTORICAL DATA (GIVEN PRIOR TO MAY 2016) 1997, 01/13/1998, 03/13/1998    Tuberculin Skin Test 09/05/2016, 10/17/2017, 10/07/2019    Varicella Vaccine Live 02/11/2000       SOCIAL HISTORY:   Social History     Socioeconomic History    Marital status: Single   Tobacco Use    Smoking status: Never    Smokeless tobacco: Never   Vaping Use    Vaping Use: Never used   Substance and Sexual Activity    Alcohol use: Yes    Drug use: No       FAMILY HISTORY:  History reviewed. No pertinent family history.         Objective     Vital Signs: /66 (BP Location: Left arm, Patient Position: Sitting, BP Cuff Size: Adult)   Pulse 84   Temp 37.3 °C (99.1 °F) (Temporal)   Ht 1.524 m (5')   Wt 68.5 kg (151 lb)   SpO2 94% Body mass index is 29.49 kg/m².    General: Appears well and comfortable  Eyes: No scleral or conjunctival lesions  ENT: No apparent oral or nasal lesions  Head/Neck: Trace psoriasis plaques on the edge of occipital scalp   Cardiovascular: Regular rate and rhythm  Respiratory: Breathing quiet and unlabored  Gastrointestinal: No apparent organomegaly or abdominal masses  Integumentary: Minimal diffuse livedo reticularis on lower extremities more than upper extremities  Musculoskeletal: No significant joint tenderness, periarticular soft tissue swelling, warmth, erythema, or overt synovitis; no significant restriction in range of motion of joints examined  Neurologic: No focal sensory or motor deficits  Psychiatric: Mood and affect appropriate      LABORATORY RESULTS REVIEWED AND INTERPRETED BY ME:  Lab Results   Component Value Date/Time    CREACTPROT 0.20 03/04/2021 10:48 AM    SEDRATEWES 13 03/04/2021 10:48 AM     Lab Results   Component Value Date/Time    U7UNSKKDYJI 124.0 07/11/2023 05:41 PM     U6DIHZAGJWW 14.3 (L) 07/11/2023 05:41 PM     Lab Results   Component Value Date/Time    CCPANTIBODY 3 03/04/2021 10:48 AM    RUMQ81FAIO Negative 03/04/2021 10:49 AM     Lab Results   Component Value Date/Time    ANTINUCAB Detected (A) 07/11/2023 05:41 PM     Lab Results   Component Value Date/Time    ANTIDNADS SEE BELOW 07/11/2023 05:41 PM    SMITHAB 2 07/11/2023 05:41 PM    RNPAB 4 07/11/2023 05:41 PM    LJYSEXI41 2 07/11/2023 05:41 PM    SSA60 4 07/11/2023 05:41 PM    SSA52 6 07/11/2023 05:41 PM    ANTISSBSJ 1 07/11/2023 05:41 PM    JO1AB 1 07/11/2023 05:41 PM     Lab Results   Component Value Date/Time    MICROSOMALA 0.7 03/04/2021 10:48 AM    MICROSOMALA <9.0 03/04/2021 10:48 AM    ANTITHYROGL <0.9 03/04/2021 10:48 AM     Lab Results   Component Value Date/Time    25HYDROXY 18 (L) 03/04/2021 10:48 AM     Lab Results   Component Value Date/Time    PROTHROMBTM 11.7 04/26/2009 12:20 AM    INR 1.02 04/26/2009 12:20 AM     Lab Results   Component Value Date/Time    WBC 10.20 (H) 09/20/2023 07:28 PM    WBC 7.2 03/04/2021 10:48 AM    RBC 4.93 09/20/2023 07:28 PM    RBC 5.24 03/04/2021 10:48 AM    HEMOGLOBIN 15.1 09/20/2023 07:28 PM    HEMOGLOBIN 15.6 03/04/2021 10:48 AM    HEMATOCRIT 43.4 09/20/2023 07:28 PM    HEMATOCRIT 46.6 03/04/2021 10:48 AM    MCV 88.0 09/20/2023 07:28 PM    MCV 88.9 03/04/2021 10:48 AM    MCH 30.6 09/20/2023 07:28 PM    MCH 29.8 03/04/2021 10:48 AM    MCHC 34.8 09/20/2023 07:28 PM    MCHC 33.5 (L) 03/04/2021 10:48 AM    RDW 13.2 09/20/2023 07:28 PM    RDW 40.8 03/04/2021 10:48 AM    PLATELETCT 278 09/20/2023 07:28 PM    PLATELETCT 303 03/04/2021 10:48 AM    MPV 9.2 09/20/2023 07:28 PM    MPV 11.1 03/04/2021 10:48 AM    NEUTS 6.7 09/20/2023 07:28 PM    NEUTS 5.23 03/04/2021 10:48 AM    LYMPHOCYTES 27.0 09/20/2023 07:28 PM    LYMPHOCYTES 20.30 (L) 03/04/2021 10:48 AM    MONOCYTES 6.1 09/20/2023 07:28 PM    MONOCYTES 5.40 03/04/2021 10:48 AM    EOSINOPHILS 0.4 09/20/2023 07:28 PM    EOSINOPHILS  1.50 03/04/2021 10:48 AM    BASOPHILS 0.7 09/20/2023 07:28 PM    BASOPHILS 0.40 03/04/2021 10:48 AM    HYPOCHROMIA 1+ 12/10/2012 11:00 AM     Lab Results   Component Value Date/Time    ASTSGOT 21 09/20/2023 07:28 PM    ASTSGOT 23 03/04/2021 10:48 AM    ALTSGPT 17 09/20/2023 07:28 PM    ALTSGPT 21 03/04/2021 10:48 AM    ALKPHOSPHAT 82 09/20/2023 07:28 PM    ALKPHOSPHAT 101 (H) 03/04/2021 10:48 AM    TBILIRUBIN 0.7 09/20/2023 07:28 PM    TBILIRUBIN 0.5 03/04/2021 10:48 AM    TOTPROTEIN 8.5 (H) 09/20/2023 07:28 PM    TOTPROTEIN 7.8 03/04/2021 10:48 AM    ALBUMIN 5.3 (H) 09/20/2023 07:28 PM    ALBUMIN 4.7 03/04/2021 10:48 AM     Lab Results   Component Value Date/Time    SODIUM 142 09/20/2023 07:28 PM    SODIUM 142 03/04/2021 10:48 AM    POTASSIUM 3.4 (L) 09/20/2023 07:28 PM    POTASSIUM 3.8 03/04/2021 10:48 AM    CHLORIDE 108 09/20/2023 07:28 PM    CHLORIDE 108 03/04/2021 10:48 AM    CO2 25.0 09/20/2023 07:28 PM    CO2 24 03/04/2021 10:48 AM    GLUCOSE 88 09/20/2023 07:28 PM    GLUCOSE 94 03/04/2021 10:48 AM    BUN 7.0 09/20/2023 07:28 PM    BUN 9 03/04/2021 10:48 AM    CREATININE 0.7 09/20/2023 07:28 PM    CREATININE 0.50 03/04/2021 10:48 AM    CREATININE 0.5 04/26/2009 12:20 AM    BUNCREATRAT 10.0 09/20/2023 07:28 PM    CALCIUM 10.0 09/20/2023 07:28 PM    CALCIUM 9.7 03/04/2021 10:48 AM     Lab Results   Component Value Date/Time    COLORURINE Lt. Yellow 12/10/2012 09:20 AM    SPECGRAVITY 1.016 12/10/2012 09:20 AM    PHURINE 5.5 12/10/2012 09:20 AM    GLUCOSEUR Negative 12/10/2012 09:20 AM    KETONES Negative 12/10/2012 09:20 AM    PROTEINURIN Negative 12/10/2012 09:20 AM     Lab Results   Component Value Date/Time    HBA1C 5.4 12/07/2022 07:27 AM       RADIOLOGY RESULTS REVIEWED AND INTERPRETED BY ME:  Results for orders placed during the hospital encounter of 04/07/21    DX-JOINT SURVEY-FEET SINGLE VIEW    Impression  Unremarkable bilateral PA view if the feet. No radiographic evidence for active inflammatory  arthritis at this time.    This exam was performed in an orthopedic clinic of the [facility]. This interpretation is performed in addition to the interpretation by the ordering provider.    Results for orders placed during the hospital encounter of 04/07/21    DX-JOINT SURVEY-HANDS SINGLE VIEW    Impression  Unremarkable examination of hands. No radiographic evidence for active inflammatory arthritis at this time.    This exam was performed in an orthopedic clinic of the [facility]. This interpretation is performed in addition to the interpretation by the ordering provider.      All relevant laboratory and imaging results reported on this note were reviewed and interpreted by me.         Assessment & Plan     Abril Gutierrez is a 28 y.o. female with history as noted above whose presentation merits the following diagnostic and clinical status impressions and recommendations:    1. Psoriatic arthritis (HCC)  Clinically relatively low disease activity that appears to be well-controlled on the current regimen of Cimzia, so no need for modification of treatment.  - CRP QUANTITIVE (NON-CARDIAC); Future  - Sed Rate; Future  - Cimzia 200 mg SC every 2 weeks    2. Plaque psoriasis  Clinically relatively low disease activity that appears to be well-controlled on the current regimen of Cimzia, so no need for modification of treatment.  - CRP QUANTITIVE (NON-CARDIAC); Future  - Sed Rate; Future  - Cimzia 200 mg SC every 2 weeks    3. 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 Raises some degree of suspicion for TNF-alpha inhibitor-induced autoimmunity, but seems unlikely as her FARHANA was initially positive in 12/2022 prior to initiation of Cimzia in 1/2023.  - CRP QUANTITIVE (NON-CARDIAC); Future  - Sed Rate; Future    4. Immunosuppressed status  (HCC)  Presently with no history, physical, or laboratory evidence to suggest significant adverse drug effects or opportunistic infections, but need routine monitoring per guidelines.  - CBC WITH DIFFERENTIAL; Future  - Comp Metabolic Panel; Future  - Need to ascertain age-appropriate vaccines in addition to the ones listed above under immunizations      The above assessment and plan were discussed with the patient who acknowledged understanding of the plan.    FOLLOW-UP: Return in about 6 months (around 4/10/2024) for Short.         Thank you for the opportunity to participate in the care of Abril Gutierrez.    John Goodwin MD, MS  Rheumatologist, [facility] Rheumatology ? [facility]   of Clinical Medicine, Department of Internal Medicine  [facility] ? [facility]

## 2023-10-11 NOTE — PATIENT INSTRUCTIONS
AFTER VISIT INSTRUCTIONS    Below are important information to help you navigate your healthcare needs and help us serve you safely and effectively:  If laboratory tests and/or imaging studies were ordered, remember to go get them done as instructed.  If new prescriptions and/or refills were sent, remember to go pick them up from your local pharmacy, or call the specialty pharmacy to request shipment.  Always take your prescription medications exactly as prescribed unless instructed otherwise.  Note that antirheumatic drugs and steroids are immunosuppressive which means they increase your risk of infections and have multiple potential adverse effects on various organ systems in your body, though most of them are uncommon.  It is important that you are up-to-date on age-appropriate immunizations, particularly shingles and bacterial/viral pneumonia vaccines, which you can request from me or your primary care provider.  Be sure to read the drug package inserts to learn about the potential side effects of your medications before you start taking them.  If you experience any significant drug side effects, stop taking the medication and notify me promptly, and depending on the severity of the side effects, consider going to an urgent care or emergency department for immediate attention.  If there are significant findings on your lab tests and imaging studies that warrant further action, I will notify you with explanations via [email or web address] or phone call, otherwise you can view them on [employer] and let me know if you have any questions.  Note that [employer] messages are typically read during office hours and may take 1-7 business days before a response depending on the urgency of the situation and how busy my clinic schedule is.  In general, [employer] messaging is for non-urgent matters that do not require immediate attention, so for urgent matters that cannot wait, you are advised to go to an urgent care.  Lastly, you are granted  Trentt access to my documentation of your visit and are encouraged to read my note which details my assessment and plan for your condition.  To learn more about your condition and rheumatic diseases evaluated and treated by rheumatologists, as well as gain access to many helpful resources about these diseases, visit our website at [email or web address].

## 2024-03-20 ENCOUNTER — HOSPITAL ENCOUNTER (OUTPATIENT)
Facility: MEDICAL CENTER | Age: 29
End: 2024-03-20
Attending: NURSE PRACTITIONER
Payer: COMMERCIAL

## 2024-03-20 ENCOUNTER — EH NON-PROVIDER (OUTPATIENT)
Dept: OCCUPATIONAL MEDICINE | Facility: CLINIC | Age: 29
End: 2024-03-20

## 2024-03-20 DIAGNOSIS — Z02.89 ENCOUNTER FOR OCCUPATIONAL HEALTH EXAMINATION: Primary | ICD-10-CM

## 2024-03-20 DIAGNOSIS — Z02.89 ENCOUNTER FOR OCCUPATIONAL HEALTH EXAMINATION: ICD-10-CM

## 2024-03-20 LAB
AMP AMPHETAMINE: ABNORMAL
BAR BARBITURATES: ABNORMAL
BZO BENZODIAZEPINES: ABNORMAL
COC COCAINE: ABNORMAL
INT CON NEG: ABNORMAL
INT CON POS: ABNORMAL
MDMA ECSTASY: ABNORMAL
MET METHAMPHETAMINES: ABNORMAL
MTD METHADONE: ABNORMAL
OPI OPIATES: ABNORMAL
OXY OXYCODONE: ABNORMAL
PCP PHENCYCLIDINE: ABNORMAL
POC URINE DRUG SCREEN OCDRS: ABNORMAL
THC: ABNORMAL

## 2024-03-20 PROCEDURE — 80305 DRUG TEST PRSMV DIR OPT OBS: CPT | Performed by: NURSE PRACTITIONER

## 2024-03-20 PROCEDURE — 86480 TB TEST CELL IMMUN MEASURE: CPT | Performed by: NURSE PRACTITIONER

## 2024-03-24 LAB
GAMMA INTERFERON BACKGROUND BLD IA-ACNC: 0.05 IU/ML
M TB IFN-G BLD-IMP: NEGATIVE
M TB IFN-G CD4+ BCKGRND COR BLD-ACNC: 0.01 IU/ML
MITOGEN IGNF BCKGRD COR BLD-ACNC: >10 IU/ML
QFT TB2 - NIL TBQ2: 0.02 IU/ML

## 2024-05-23 ENCOUNTER — HOSPITAL ENCOUNTER (OUTPATIENT)
Dept: LAB | Facility: MEDICAL CENTER | Age: 29
End: 2024-05-23
Attending: STUDENT IN AN ORGANIZED HEALTH CARE EDUCATION/TRAINING PROGRAM
Payer: COMMERCIAL

## 2024-05-23 DIAGNOSIS — L40.0 PLAQUE PSORIASIS: ICD-10-CM

## 2024-05-23 DIAGNOSIS — R76.8 SEROLOGIC AUTOIMMUNITY: ICD-10-CM

## 2024-05-23 DIAGNOSIS — L40.50 PSORIATIC ARTHRITIS (HCC): ICD-10-CM

## 2024-05-23 DIAGNOSIS — D84.9 IMMUNOSUPPRESSED STATUS (HCC): ICD-10-CM

## 2024-05-24 LAB
ALBUMIN SERPL BCP-MCNC: 4.2 G/DL (ref 3.2–4.9)
ALBUMIN/GLOB SERPL: 1.3 G/DL
ALP SERPL-CCNC: 84 U/L (ref 30–99)
ALT SERPL-CCNC: 24 U/L (ref 2–50)
ANION GAP SERPL CALC-SCNC: 12 MMOL/L (ref 7–16)
AST SERPL-CCNC: 22 U/L (ref 12–45)
BILIRUB SERPL-MCNC: 0.3 MG/DL (ref 0.1–1.5)
BUN SERPL-MCNC: 12 MG/DL (ref 8–22)
CALCIUM ALBUM COR SERPL-MCNC: 9 MG/DL (ref 8.5–10.5)
CALCIUM SERPL-MCNC: 9.2 MG/DL (ref 8.5–10.5)
CHLORIDE SERPL-SCNC: 104 MMOL/L (ref 96–112)
CO2 SERPL-SCNC: 24 MMOL/L (ref 20–33)
CREAT SERPL-MCNC: 0.54 MG/DL (ref 0.5–1.4)
CRP SERPL HS-MCNC: 0.44 MG/DL (ref 0–0.75)
GFR SERPLBLD CREATININE-BSD FMLA CKD-EPI: 130 ML/MIN/1.73 M 2
GLOBULIN SER CALC-MCNC: 3.2 G/DL (ref 1.9–3.5)
GLUCOSE SERPL-MCNC: 101 MG/DL (ref 65–99)
POTASSIUM SERPL-SCNC: 3.7 MMOL/L (ref 3.6–5.5)
PROT SERPL-MCNC: 7.4 G/DL (ref 6–8.2)
SODIUM SERPL-SCNC: 140 MMOL/L (ref 135–145)

## 2024-06-20 ENCOUNTER — OFFICE VISIT (OUTPATIENT)
Dept: RHEUMATOLOGY | Facility: MEDICAL CENTER | Age: 29
End: 2024-06-20
Attending: STUDENT IN AN ORGANIZED HEALTH CARE EDUCATION/TRAINING PROGRAM
Payer: COMMERCIAL

## 2024-06-20 VITALS
BODY MASS INDEX: 30.27 KG/M2 | TEMPERATURE: 98.2 F | OXYGEN SATURATION: 97 % | SYSTOLIC BLOOD PRESSURE: 106 MMHG | DIASTOLIC BLOOD PRESSURE: 72 MMHG | WEIGHT: 155 LBS | HEART RATE: 65 BPM

## 2024-06-20 DIAGNOSIS — E55.9 VITAMIN D INSUFFICIENCY: ICD-10-CM

## 2024-06-20 DIAGNOSIS — L40.50 PSORIATIC ARTHRITIS (HCC): ICD-10-CM

## 2024-06-20 DIAGNOSIS — L40.0 PLAQUE PSORIASIS: ICD-10-CM

## 2024-06-20 DIAGNOSIS — R76.8 SEROLOGIC AUTOIMMUNITY: ICD-10-CM

## 2024-06-20 DIAGNOSIS — D84.9 IMMUNOSUPPRESSED STATUS (HCC): ICD-10-CM

## 2024-06-20 PROCEDURE — 3074F SYST BP LT 130 MM HG: CPT | Performed by: STUDENT IN AN ORGANIZED HEALTH CARE EDUCATION/TRAINING PROGRAM

## 2024-06-20 PROCEDURE — 3078F DIAST BP <80 MM HG: CPT | Performed by: STUDENT IN AN ORGANIZED HEALTH CARE EDUCATION/TRAINING PROGRAM

## 2024-06-20 PROCEDURE — 99214 OFFICE O/P EST MOD 30 MIN: CPT | Performed by: STUDENT IN AN ORGANIZED HEALTH CARE EDUCATION/TRAINING PROGRAM

## 2024-06-20 PROCEDURE — 99212 OFFICE O/P EST SF 10 MIN: CPT | Performed by: STUDENT IN AN ORGANIZED HEALTH CARE EDUCATION/TRAINING PROGRAM

## 2024-06-20 RX ORDER — FLUOXETINE HYDROCHLORIDE 40 MG/1
40 CAPSULE ORAL DAILY
COMMUNITY

## 2024-06-20 RX ORDER — CERTOLIZUMAB PEGOL 200 MG/ML
200 INJECTION, SOLUTION SUBCUTANEOUS
Qty: 2 ML | Refills: 5 | Status: SHIPPED | OUTPATIENT
Start: 2024-06-20

## 2024-06-20 ASSESSMENT — PATIENT HEALTH QUESTIONNAIRE - PHQ9
5. POOR APPETITE OR OVEREATING: 2 - MORE THAN HALF THE DAYS
SUM OF ALL RESPONSES TO PHQ QUESTIONS 1-9: 19
CLINICAL INTERPRETATION OF PHQ2 SCORE: 6

## 2024-06-20 ASSESSMENT — FIBROSIS 4 INDEX: FIB4 SCORE: 0.42

## 2024-06-20 NOTE — PATIENT INSTRUCTIONS
[facility] AFTER [facility]    Below are important guidelines to help you navigate your health care needs and assist us in caring for you safely and effectively. We encourage you to carefully read and understand this information and adhere to them accordingly.    [employer] Messaging and Phone Calls:  Diagnosis and Treatment - For a detailed explanation of your condition and treatment plan from today's visit, refer to the visit note on [employer] via the following steps:  Log in to [employer] and click on “Visits” at the top.  Scroll down to “Past Visits” under Appointments.  Click on “View Notes” under the appropriate visit date.  Questions or Concerns - MyChart messaging is for non-urgent matters that do not require immediate attention and should be brief with no more than two questions or concerns. If you have multiple questions or concerns, we ask that you schedule an appointment to have them properly addressed.  Response to Messages - [employer] messages are addressed throughout the week depending on clinical availability, so we ask that you allow up to one week for a response.  Phone Calls and Voicemails - Phone calls and voicemail messages are reserved for time-sensitive matters that cannot wait to be addressed via [employer]. We ask that you refrain from calling the office multiple times or leaving multiple voicemails regarding the same issue as doing so may lead to delays in response time.  Urgent Issues - For urgent medical matters or medical emergencies that cannot wait, you are advised to go to your nearest Urgent Care or Emergency Department for immediate attention.    Laboratory Tests and Imaging Studies:  Future Lab and Imaging Orders - We ask that you get your lab tests and imaging studies done no later than one week before your follow-up visit unless instructed otherwise.  Results Communication - You may see some test results marked as “abnormal” that are not necessarily significant or concerning. If  there are significant abnormalities on your test results that warrant further action, you will be notified via MyChart or phone call, otherwise they will be addressed at your follow-up visit.    Prescriptions and Refill Requests:  General Prescriptions (e.g. prednisone, hydroxychloroquine, leflunomide, methotrexate, etc.) - These are sent to Retail Pharmacies, so all refill requests of these medications should be directed to your local pharmacy.  Specialty Prescriptions (e.g. Enbrel, Humira, Cosentyx, Xeljanz, etc.) - These are sent to Specialty Pharmacies, so all refill requests of these medications should be directed to your designated specialty pharmacy.  Infusion Prescriptions (e.g. Remicade, Simponi Aria, Rituxan, Saphnelo, etc.) - These are sent to Outpatient Infusion Centers, so all scheduling requests of these medications should be directed to your local infusion center.    Medication Risks and Adverse Effects:  Immunosuppressed Status - Steroids and antirheumatic drugs are immunosuppressants, so they increase the risk of infections and can have side effects on various organ systems in your body, though most of them are uncommon.  Potential Side Effects - Be sure to read the drug package inserts to learn about the potential side effects of your medications before you start taking them and take them exactly as prescribed unless instructed otherwise.  In Case of Side Effects - If you experience any significant side effects, stop taking the medication immediately and promptly notify the prescriber. Depending on the severity of the side effects, consider going to an Urgent Care or Emergency Department for immediate attention.    Immunizations and Health Screening:  Vaccinations - If you are on immunosuppressive therapy, it is important that you are up to date on age-appropriate immunizations, particularly shingles and pneumonia vaccines, which you can request from your primary care provider or from us at your  next appointment.  Screening Tests - It is also important that you are up to date on age-appropriate screening tests, such as pap smear, mammography, and colonoscopy, which you can request from your primary care provider.    Educational and Supportive Resources:  [facility] ([email or web address]) - Visit our website to learn more about your condition and other rheumatic diseases, and gain access to many helpful resources for them.  Disposal of Old Medications (www.miguel.gov/everyday-takeback-day) - Visit the Drug Enforcement Administration website to find a nearby location where you can properly dispose of old medications you no longer need.  Disposal of Used [place] (www.safeneedledisposal.org) - Visit the Safe Needle Disposal Organization website to find a nearby location where you can properly dispose of used needles from your injectable medications.

## 2024-06-20 NOTE — PROGRESS NOTES
[facility] RHEUMATOLOGY  [address] [facility], [address], [place] [address]  Phone: [phone] ? Fax: [phone]  [facility]/Health-Services/Rheumatology    FOLLOW-UP VISIT NOTE      DATE OF SERVICE: 06/20/2024         Subjective     PRIMARY CARE PRACTITIONER:  Renetta Black P.A.-C.  [address] [place] [id number]    PATIENT IDENTIFICATION:  Abril Bennett  [address] [place] [address]    YOB: 1995    MEDICAL RECORD NUMBER: [id number]          CHIEF COMPLAINT:   Chief Complaint   Patient presents with    Follow-Up     Psoriatic arthritis (HCC)       RHEUMATOLOGIC HISTORY:  Abril Bennett is a 28 y.o. female with pertinent history notable for psoriatic arthritis diagnosed in late 2022 (reportedly symptmatic since early 2021), plaque psoriasis since teenage years (confirmed on skin biopsy of right occipital scalp in 12/2022), hyperhidrosis since childhood s/p VATS sympathectomy in 10/2023 at [facility], livedo reticularis, anxiety and depression among other comorbidities. She initially presented on 5/12/23 to establish care for rheumatologic evaluation and management of her PsA in the setting of positive FARHANA with concern for other connective tissue disease. Reported variable course of symptoms including episodic pain/swelling of her hands, variable durations of morning stiffness, muscle pain with body aches, cold-induced red color changes of fingers, and chronic sweaty palms and armpits due to her hyperhidrosis. She previously managed with analgesics until she was started on Cimzia which had been helpful for her skin and joints.     Pertinent treatments: Cimzia 400>200 mg SC every 2 weeks (1/2023-present, effective).     Pertinent positive labs: Positive FARHANA 1:1280 homogenous pattern, anti-dsDNA 119 at 1:80 titer, and anti-chromatin 23 with negative anti-histone (in 7/2023); low C4 of 14.3 with normal C3 of 124 (in 7/2023); low vitamin D of 22 (in 12/2022).    Pertinent  negative labs: Negative HLA-B27, anti-TPO and anti-TG (in 3/2021), TSH (in 12/2022), QTB (in 3/2024), CRP, eGFR, creatinine, and LFTs (in 5/2024).     Pertinent XR imaging: Hands and feet (in 4/2021) with no radiographic evidence of inflammatory or degenerative arthropathy.       INTERVAL HISTORY:  Reports interval history as noted on the questionnaire below or scanned under media tab.  Notably since few weeks ago, somewhat increasing pain in the hands, elbows, knees, and ankles.  Variable degrees of morning stiffness that improves with activity and worsening pain on physical activity.  Some fatigue with body aches but otherwise generally doing well.    REVIEW OF SYSTEMS:  Except as noted in the history above, relevant review of systems with emphasis on autoimmune rheumatic diseases was otherwise negative.      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) 05/12/2023    Immunosuppressed status (HCC) 05/12/2023       PAST MEDICAL HISTORY:  Past Medical History:   Diagnosis Date    Anxiety     Depression        PAST SURGICAL HISTORY:  Past Surgical History:   Procedure Laterality Date    APPENDECTOMY  2010       SOCIAL HISTORY:  Social History     Socioeconomic History    Marital status: Single     Spouse name: Not on file    Number of children: Not on file    Years of education: Not on file    Highest education level: Not on file   Occupational History    Not on file   Tobacco Use    Smoking status: Never    Smokeless tobacco: Never   Vaping Use    Vaping status: Never Used   Substance and Sexual Activity    Alcohol use: Yes    Drug use: No    Sexual activity: Not on file   Other Topics Concern    Not on file   Social History Narrative    Not on file      Social Determinants of Health     Financial Resource Strain: Not on file   Food Insecurity: Not on file   Transportation Needs: Not on file   Physical Activity: Not on file   Stress: Not on file   Social Connections: Not on file   Intimate Partner Violence: Not on file   Housing Stability: Not on file       FAMILY HISTORY:  No family history on file.    MEDICATIONS:  Current Outpatient Medications   Medication Sig    PROZAC 40 MG capsule Take 40 mg by mouth every day.    certolizumab pegol (CIMZIA, 2 SYRINGE,) 200 MG/ML Prefilled Syringe Kit Inject 200 mg under the skin every 14 days.    etonogestrel (NEXPLANON) 68 MG Implant implant Inject  under the skin.       ALLERGIES:   No Known Allergies    IMMUNIZATIONS:  Immunization History   Administered Date(s) Administered    9VHPV VACCINE 2-3 DOSE IM (GARDASIL 9) 08/06/2015, 08/06/2015    Dtap Vaccine 1995, 01/13/1998, 03/13/1998, 12/02/1998, 09/06/2001    HIB Vaccine (ACTHIB/HIBERIX) 12/02/1998, 02/02/1999    HPV Quadrivalent Vaccine (GARDASIL) - HISTORICAL DATA 05/05/2011, 05/05/2011    Hepatitis A Vaccine, Ped/Adol 02/13/2002, 08/15/2002    Hepatitis B Vaccine Adolescent/Pediatric 12/02/1998, 02/02/1999, 06/02/1999    IPV 09/06/2001    Influenza (IM) Preservative Free - HISTORICAL DATA 03/08/2011    Influenza Seasonal Injectable - Historical Data 02/22/2011, 03/08/2011, 11/08/2013, 10/17/2014, 10/15/2015, 10/25/2016    Influenza Vaccine H1N1 - HISTORICAL DATA 11/07/2009    Influenza Vaccine Quad Inj (Pf) 12/18/2018, 09/30/2020, 10/14/2021, 10/05/2022    Influenza Vaccine Quad Inj (Preserved) 10/18/2017    MENING VAC SERO B 2 DOSE SCHED IM (BEXSERO) 01/11/2018    MMR Vaccine 12/02/1998, 09/06/2001    MODERNA SARS-COV-2 VACCINE (12+) 03/11/2021, 04/08/2021, 11/29/2021    Measles Vaccine - Historical Data 10/06/1998    Meningococcal Conjugate Vaccine MCV4 (Menactra) 09/10/2009, 08/06/2015, 08/06/2015    OPV TRIVALENT - HISTORICAL DATA (GIVEN PRIOR TO MAY  2016) 1995, 01/13/1998, 03/13/1998    Tdap Vaccine 09/10/2009, 09/03/2019, 09/03/2019    Tuberculin Skin Test 09/05/2016, 10/17/2017, 10/07/2019    Varicella Vaccine Live 02/11/2000, 09/10/2009            Objective     Vital Signs: /72 (BP Location: Left arm, Patient Position: Sitting, BP Cuff Size: Adult)   Pulse 65   Temp 36.8 °C (98.2 °F) (Temporal)   Wt 70.3 kg (155 lb)   SpO2 97% Body mass index is 30.27 kg/m².    General: Appears well and comfortable  Eyes: No scleral or conjunctival lesions  ENT: No apparent oral, nasal, or ear lesions  Head/Neck: No apparent scalp or neck lesions  Cardiovascular: Regular rate and rhythm  Respiratory: Breathing quiet and unlabored  Gastrointestinal: No apparent organomegaly or abdominal masses  Integumentary: No significant cutaneous lesions or dyspigmentation  Musculoskeletal: No significant joint tenderness, periarticular soft tissue swelling, warmth, erythema, or overt synovitis; no significant restriction in range of motion of joints examined  Neurologic: No focal sensory or motor deficits  Psychiatric: Mood and affect appropriate      LABORATORY RESULTS REVIEWED AND INTERPRETED BY ME:  Lab Results   Component Value Date/Time    CREACTPROT 0.44 05/23/2024 05:59 PM    SEDRATEWES 13 03/04/2021 10:48 AM     Lab Results   Component Value Date/Time    N4DHDLYOPEX 124.0 07/11/2023 05:41 PM    R6GSIITOQTQ 14.3 (L) 07/11/2023 05:41 PM     Lab Results   Component Value Date/Time    CCPANTIBODY 3 03/04/2021 10:48 AM    INVW31DESX Negative 03/04/2021 10:49 AM     Lab Results   Component Value Date/Time    ANTINUCAB Detected (A) 07/11/2023 05:41 PM     Lab Results   Component Value Date/Time    ANTIDNADS SEE BELOW 07/11/2023 05:41 PM    SMITHAB 2 07/11/2023 05:41 PM    RNPAB 4 07/11/2023 05:41 PM    STGTTGN54 2 07/11/2023 05:41 PM    SSA60 4 07/11/2023 05:41 PM    SSA52 6 07/11/2023 05:41 PM    ANTISSBSJ 1 07/11/2023 05:41 PM    JO1AB 1 07/11/2023 05:41 PM     Lab  Results   Component Value Date/Time    MICROSOMALA 0.7 03/04/2021 10:48 AM    MICROSOMALA <9.0 03/04/2021 10:48 AM    ANTITHYROGL <0.9 03/04/2021 10:48 AM     Lab Results   Component Value Date/Time    25HYDROXY 18 (L) 03/04/2021 10:48 AM     Lab Results   Component Value Date/Time    PROTHROMBTM 11.7 04/26/2009 12:20 AM    INR 1.02 04/26/2009 12:20 AM     Lab Results   Component Value Date/Time    WBC 10.20 (H) 09/20/2023 07:28 PM    WBC 7.2 03/04/2021 10:48 AM    RBC 4.93 09/20/2023 07:28 PM    RBC 5.24 03/04/2021 10:48 AM    HEMOGLOBIN 15.1 09/20/2023 07:28 PM    HEMOGLOBIN 15.6 03/04/2021 10:48 AM    HEMATOCRIT 43.4 09/20/2023 07:28 PM    HEMATOCRIT 46.6 03/04/2021 10:48 AM    MCV 88.0 09/20/2023 07:28 PM    MCV 88.9 03/04/2021 10:48 AM    MCH 30.6 09/20/2023 07:28 PM    MCH 29.8 03/04/2021 10:48 AM    MCHC 34.8 09/20/2023 07:28 PM    MCHC 33.5 (L) 03/04/2021 10:48 AM    RDW 13.2 09/20/2023 07:28 PM    RDW 40.8 03/04/2021 10:48 AM    PLATELETCT 278 09/20/2023 07:28 PM    PLATELETCT 303 03/04/2021 10:48 AM    MPV 9.2 09/20/2023 07:28 PM    MPV 11.1 03/04/2021 10:48 AM    NEUTS 6.7 09/20/2023 07:28 PM    NEUTS 5.23 03/04/2021 10:48 AM    LYMPHOCYTES 27.0 09/20/2023 07:28 PM    LYMPHOCYTES 20.30 (L) 03/04/2021 10:48 AM    MONOCYTES 6.1 09/20/2023 07:28 PM    MONOCYTES 5.40 03/04/2021 10:48 AM    EOSINOPHILS 0.4 09/20/2023 07:28 PM    EOSINOPHILS 1.50 03/04/2021 10:48 AM    BASOPHILS 0.7 09/20/2023 07:28 PM    BASOPHILS 0.40 03/04/2021 10:48 AM    HYPOCHROMIA 1+ 12/10/2012 11:00 AM     Lab Results   Component Value Date/Time    ASTSGOT 22 05/23/2024 05:59 PM    ALTSGPT 24 05/23/2024 05:59 PM    ALKPHOSPHAT 84 05/23/2024 05:59 PM    TBILIRUBIN 0.3 05/23/2024 05:59 PM    TOTPROTEIN 7.4 05/23/2024 05:59 PM    ALBUMIN 4.2 05/23/2024 05:59 PM     Lab Results   Component Value Date/Time    SODIUM 140 05/23/2024 05:59 PM    POTASSIUM 3.7 05/23/2024 05:59 PM    CHLORIDE 104 05/23/2024 05:59 PM    CO2 24 05/23/2024 05:59 PM     GLUCOSE 101 (H) 05/23/2024 05:59 PM    BUN 12 05/23/2024 05:59 PM    CREATININE 0.54 05/23/2024 05:59 PM    CREATININE 0.5 04/26/2009 12:20 AM    BUNCREATRAT 10.0 09/20/2023 07:28 PM    CALCIUM 9.2 05/23/2024 05:59 PM     Lab Results   Component Value Date/Time    COLORURINE Lt. Yellow 12/10/2012 09:20 AM    SPECGRAVITY 1.016 12/10/2012 09:20 AM    PHURINE 5.5 12/10/2012 09:20 AM    GLUCOSEUR Negative 12/10/2012 09:20 AM    KETONES Negative 12/10/2012 09:20 AM    PROTEINURIN Negative 12/10/2012 09:20 AM     Lab Results   Component Value Date/Time    HBA1C 5.4 12/07/2022 07:27 AM       RADIOLOGY RESULTS REVIEWED AND INTERPRETED BY ME:  Results for orders placed during the hospital encounter of 04/07/21    DX-JOINT SURVEY-FEET SINGLE VIEW    Impression  Unremarkable bilateral PA view if the feet. No radiographic evidence for active inflammatory arthritis at this time.    This exam was performed in an orthopedic clinic of the [facility]. This interpretation is performed in addition to the interpretation by the ordering provider.    Results for orders placed during the hospital encounter of 04/07/21    DX-JOINT SURVEY-HANDS SINGLE VIEW    Impression  Unremarkable examination of hands. No radiographic evidence for active inflammatory arthritis at this time.    This exam was performed in an orthopedic clinic of the [facility]. This interpretation is performed in addition to the interpretation by the ordering provider.      All relevant laboratory and imaging results reported on this note were reviewed and interpreted by me.         Assessment & Plan     Abril Bennett is a 28 y.o. female with history and physical as noted above whose presentation merits the following clinical impressions and recommendations:    1. Psoriatic arthritis (HCC)  Clinically low disease activity that appears to remain well-controlled on the current regimen of Cimzia, so no need for modification of treatment.  -  CRP QUANTITIVE (NON-CARDIAC); Future  - Sed Rate; Future  - certolizumab pegol (CIMZIA, 2 SYRINGE,) 200 MG/ML Prefilled Syringe Kit; Inject 200 mg under the skin every 14 days.  Dispense: 2 mL; Refill: 5     2. Plaque psoriasis  Clinically low disease activity that appears to remain well-controlled on the current regimen of Cimzia, so no need for modification of treatment.  - certolizumab pegol (CIMZIA, 2 SYRINGE,) 200 MG/ML Prefilled Syringe Kit; Inject 200 mg under the skin every 14 days.  Dispense: 2 mL; Refill: 5     3. 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 Initially raised some suspicion for TNF-alpha inhibitor-induced autoimmunity, but seems unlikely as her FARHANA was initially positive in 12/2022 prior to initiation of Cimzia in 1/2023.  - DSDNA AB, IGG W/RFLX TO IFA TITER; Future  - COMPLEMENT C3; Future  - COMPLEMENT C4; Future  - COMPLEMENT TOTAL (CH50); Future  - Consider hydroxychloroquine depending on her clinical trajectory    4. Vitamin D insufficiency  Hypovitaminosis D or deficiency can contribute to diffuse musculoskeletal aches, fatigue, malaise, and risk of decrease in bone density predisposing to osteopenia, so need supplementation and follow-up retesting.  - VITAMIN D,25 HYDROXY (DEFICIENCY); Future  - Recommend vitamin D 0395-7500 IU daily    5. Immunosuppressed status (HCC)  Presently with no history, physical, or laboratory evidence to suggest significant adverse drug effects or opportunistic infections, but need routine monitoring per guidelines.  - CBC WITH DIFFERENTIAL; Future  - Comp Metabolic Panel; Future  - Need to ascertain age-appropriate vaccines in addition to the ones listed above under immunizations      The above assessment and plan were discussed with the patient who acknowledged understanding  of the plan.    FOLLOW-UP: Return in about 4 months (around 10/20/2024) for Short.         Thank you for the opportunity to participate in the care of Abril Bennett.    John Goodwin MD, MS, FACR  Rheumatologist, [facility]ology, [facility]   of Clinical Medicine, Department of Internal Medicine  [facility]

## 2024-07-01 ENCOUNTER — APPOINTMENT (OUTPATIENT)
Dept: URGENT CARE | Facility: CLINIC | Age: 29
End: 2024-07-01
Payer: COMMERCIAL

## 2024-10-14 ENCOUNTER — HOSPITAL ENCOUNTER (OUTPATIENT)
Dept: LAB | Facility: MEDICAL CENTER | Age: 29
End: 2024-10-14
Attending: STUDENT IN AN ORGANIZED HEALTH CARE EDUCATION/TRAINING PROGRAM
Payer: COMMERCIAL

## 2024-10-14 DIAGNOSIS — L40.50 PSORIATIC ARTHRITIS (HCC): ICD-10-CM

## 2024-10-14 DIAGNOSIS — R76.8 SEROLOGIC AUTOIMMUNITY: ICD-10-CM

## 2024-10-14 DIAGNOSIS — E55.9 VITAMIN D INSUFFICIENCY: ICD-10-CM

## 2024-10-14 DIAGNOSIS — D84.9 IMMUNOSUPPRESSED STATUS (HCC): ICD-10-CM

## 2024-10-14 LAB
25(OH)D3 SERPL-MCNC: 18 NG/ML (ref 30–100)
ALBUMIN SERPL BCP-MCNC: 4 G/DL (ref 3.2–4.9)
ALBUMIN/GLOB SERPL: 1.3 G/DL
ALP SERPL-CCNC: 82 U/L (ref 30–99)
ALT SERPL-CCNC: 22 U/L (ref 2–50)
ANION GAP SERPL CALC-SCNC: 14 MMOL/L (ref 7–16)
AST SERPL-CCNC: 25 U/L (ref 12–45)
BASOPHILS # BLD AUTO: 0.4 % (ref 0–1.8)
BASOPHILS # BLD: 0.03 K/UL (ref 0–0.12)
BILIRUB SERPL-MCNC: 0.3 MG/DL (ref 0.1–1.5)
BUN SERPL-MCNC: 15 MG/DL (ref 8–22)
C3 SERPL-MCNC: 109.4 MG/DL (ref 87–200)
C4 SERPL-MCNC: 13.8 MG/DL (ref 19–52)
CALCIUM ALBUM COR SERPL-MCNC: 9.2 MG/DL (ref 8.5–10.5)
CALCIUM SERPL-MCNC: 9.2 MG/DL (ref 8.5–10.5)
CHLORIDE SERPL-SCNC: 102 MMOL/L (ref 96–112)
CO2 SERPL-SCNC: 23 MMOL/L (ref 20–33)
CREAT SERPL-MCNC: 0.56 MG/DL (ref 0.5–1.4)
CRP SERPL HS-MCNC: 1.02 MG/DL (ref 0–0.75)
EOSINOPHIL # BLD AUTO: 0.1 K/UL (ref 0–0.51)
EOSINOPHIL NFR BLD: 1.2 % (ref 0–6.9)
ERYTHROCYTE [DISTWIDTH] IN BLOOD BY AUTOMATED COUNT: 42.6 FL (ref 35.9–50)
ERYTHROCYTE [SEDIMENTATION RATE] IN BLOOD BY WESTERGREN METHOD: 12 MM/HOUR (ref 0–25)
GFR SERPLBLD CREATININE-BSD FMLA CKD-EPI: 128 ML/MIN/1.73 M 2
GLOBULIN SER CALC-MCNC: 3 G/DL (ref 1.9–3.5)
GLUCOSE SERPL-MCNC: 77 MG/DL (ref 65–99)
HCT VFR BLD AUTO: 41.3 % (ref 37–47)
HGB BLD-MCNC: 13.6 G/DL (ref 12–16)
IMM GRANULOCYTES # BLD AUTO: 0.03 K/UL (ref 0–0.11)
IMM GRANULOCYTES NFR BLD AUTO: 0.4 % (ref 0–0.9)
LYMPHOCYTES # BLD AUTO: 2.93 K/UL (ref 1–4.8)
LYMPHOCYTES NFR BLD: 35.7 % (ref 22–41)
MCH RBC QN AUTO: 29.8 PG (ref 27–33)
MCHC RBC AUTO-ENTMCNC: 32.9 G/DL (ref 32.2–35.5)
MCV RBC AUTO: 90.6 FL (ref 81.4–97.8)
MONOCYTES # BLD AUTO: 0.52 K/UL (ref 0–0.85)
MONOCYTES NFR BLD AUTO: 6.3 % (ref 0–13.4)
NEUTROPHILS # BLD AUTO: 4.6 K/UL (ref 1.82–7.42)
NEUTROPHILS NFR BLD: 56 % (ref 44–72)
NRBC # BLD AUTO: 0 K/UL
NRBC BLD-RTO: 0 /100 WBC (ref 0–0.2)
PLATELET # BLD AUTO: 290 K/UL (ref 164–446)
PMV BLD AUTO: 11 FL (ref 9–12.9)
POTASSIUM SERPL-SCNC: 3.8 MMOL/L (ref 3.6–5.5)
PROT SERPL-MCNC: 7 G/DL (ref 6–8.2)
RBC # BLD AUTO: 4.56 M/UL (ref 4.2–5.4)
SODIUM SERPL-SCNC: 139 MMOL/L (ref 135–145)
WBC # BLD AUTO: 8.2 K/UL (ref 4.8–10.8)

## 2024-10-14 PROCEDURE — 36415 COLL VENOUS BLD VENIPUNCTURE: CPT

## 2024-10-14 PROCEDURE — 85025 COMPLETE CBC W/AUTO DIFF WBC: CPT

## 2024-10-14 PROCEDURE — 86256 FLUORESCENT ANTIBODY TITER: CPT

## 2024-10-14 PROCEDURE — 80053 COMPREHEN METABOLIC PANEL: CPT

## 2024-10-14 PROCEDURE — 86162 COMPLEMENT TOTAL (CH50): CPT

## 2024-10-14 PROCEDURE — 82306 VITAMIN D 25 HYDROXY: CPT

## 2024-10-14 PROCEDURE — 86160 COMPLEMENT ANTIGEN: CPT | Mod: 91

## 2024-10-14 PROCEDURE — 86140 C-REACTIVE PROTEIN: CPT

## 2024-10-14 PROCEDURE — 86225 DNA ANTIBODY NATIVE: CPT

## 2024-10-14 PROCEDURE — 85652 RBC SED RATE AUTOMATED: CPT

## 2024-10-16 LAB
CH50 SERPL-ACNC: 46.9 U/ML (ref 38.7–89.9)
DSDNA AB TITR SER CLIF: NORMAL {TITER}

## 2024-10-22 ENCOUNTER — OFFICE VISIT (OUTPATIENT)
Dept: RHEUMATOLOGY | Facility: MEDICAL CENTER | Age: 29
End: 2024-10-22
Attending: STUDENT IN AN ORGANIZED HEALTH CARE EDUCATION/TRAINING PROGRAM
Payer: COMMERCIAL

## 2024-10-22 VITALS
BODY MASS INDEX: 31.64 KG/M2 | SYSTOLIC BLOOD PRESSURE: 116 MMHG | HEART RATE: 65 BPM | HEIGHT: 60 IN | DIASTOLIC BLOOD PRESSURE: 78 MMHG | OXYGEN SATURATION: 95 % | TEMPERATURE: 98.5 F | WEIGHT: 161.13 LBS

## 2024-10-22 DIAGNOSIS — E55.9 VITAMIN D DEFICIENCY: ICD-10-CM

## 2024-10-22 DIAGNOSIS — L40.0 PLAQUE PSORIASIS: ICD-10-CM

## 2024-10-22 DIAGNOSIS — R76.8 SEROLOGIC AUTOIMMUNITY: ICD-10-CM

## 2024-10-22 DIAGNOSIS — L40.50 PSORIATIC ARTHRITIS (HCC): ICD-10-CM

## 2024-10-22 DIAGNOSIS — D84.9 IMMUNOSUPPRESSED STATUS (HCC): ICD-10-CM

## 2024-10-22 LAB — DSDNA IGG TITR SER CLIF: ABNORMAL {TITER}

## 2024-10-22 PROCEDURE — 99214 OFFICE O/P EST MOD 30 MIN: CPT | Performed by: STUDENT IN AN ORGANIZED HEALTH CARE EDUCATION/TRAINING PROGRAM

## 2024-10-22 PROCEDURE — 99212 OFFICE O/P EST SF 10 MIN: CPT | Performed by: STUDENT IN AN ORGANIZED HEALTH CARE EDUCATION/TRAINING PROGRAM

## 2024-10-22 PROCEDURE — 3074F SYST BP LT 130 MM HG: CPT | Performed by: STUDENT IN AN ORGANIZED HEALTH CARE EDUCATION/TRAINING PROGRAM

## 2024-10-22 PROCEDURE — 3078F DIAST BP <80 MM HG: CPT | Performed by: STUDENT IN AN ORGANIZED HEALTH CARE EDUCATION/TRAINING PROGRAM

## 2024-10-22 RX ORDER — ESCITALOPRAM OXALATE 10 MG/1
10 TABLET ORAL DAILY
COMMUNITY
Start: 2024-10-08

## 2024-10-22 RX ORDER — ERGOCALCIFEROL 1.25 MG/1
50000 CAPSULE ORAL
Qty: 12 CAPSULE | Refills: 2 | Status: SHIPPED | OUTPATIENT
Start: 2024-10-22

## 2024-10-22 ASSESSMENT — FIBROSIS 4 INDEX: FIB4 SCORE: 0.5

## 2024-11-27 ENCOUNTER — APPOINTMENT (OUTPATIENT)
Dept: MEDICAL GROUP | Facility: PHYSICIAN GROUP | Age: 29
End: 2024-11-27
Payer: COMMERCIAL

## 2024-11-27 VITALS
TEMPERATURE: 97.1 F | HEIGHT: 60 IN | DIASTOLIC BLOOD PRESSURE: 70 MMHG | BODY MASS INDEX: 31.18 KG/M2 | OXYGEN SATURATION: 97 % | HEART RATE: 67 BPM | SYSTOLIC BLOOD PRESSURE: 114 MMHG | RESPIRATION RATE: 16 BRPM | WEIGHT: 158.8 LBS

## 2024-11-27 DIAGNOSIS — Z13.1 SCREENING FOR DIABETES MELLITUS: ICD-10-CM

## 2024-11-27 DIAGNOSIS — Z13.220 SCREENING FOR LIPID DISORDERS: ICD-10-CM

## 2024-11-27 DIAGNOSIS — Z11.3 SCREEN FOR SEXUALLY TRANSMITTED DISEASES: ICD-10-CM

## 2024-11-27 DIAGNOSIS — Z13.29 SCREENING FOR THYROID DISORDER: ICD-10-CM

## 2024-11-27 DIAGNOSIS — L40.0 PLAQUE PSORIASIS: ICD-10-CM

## 2024-11-27 DIAGNOSIS — E66.9 OBESITY (BMI 30-39.9): ICD-10-CM

## 2024-11-27 DIAGNOSIS — Z23 NEED FOR VACCINATION: ICD-10-CM

## 2024-11-27 DIAGNOSIS — Z00.00 HEALTHCARE MAINTENANCE: ICD-10-CM

## 2024-11-27 DIAGNOSIS — Z11.59 NEED FOR HEPATITIS C SCREENING TEST: ICD-10-CM

## 2024-11-27 DIAGNOSIS — L40.50 PSORIATIC ARTHRITIS (HCC): ICD-10-CM

## 2024-11-27 ASSESSMENT — FIBROSIS 4 INDEX: FIB4 SCORE: 0.5

## 2024-11-28 NOTE — PROGRESS NOTES
Verbal consent was acquired by the patient to use [employer] ambient listening note generation during this visit.    Subjective:     HPI:   History of Present Illness  The patient is a 29-year-old female presenting for the establishment of care as a new patient.    She has a medical history significant for psoriatic arthritis, livedo reticularis, and immunocompromised status secondary to her medication regimen. Additionally, she has been diagnosed with vitamin D deficiency. She is due for further screening, including thyroid function tests, hemoglobin A1c, and lipid profile. She is also scheduled for HIV and hepatitis C screening, as well as cervical cancer screening. Multiple vaccinations will be discussed during today's visit.    The patient has been managing her psoriatic arthritis with a monoclonal antibody injection for the past two years under the care of her rheumatologist, with regular laboratory monitoring every few months. She reports arthritic swelling in her wrists and psoriatic skin lesions, particularly on her scalp, for which she uses topical treatments prescribed by her dermatologist. Her rheumatologist is considering a change in her arthritis medication.    A few weeks ago, she was diagnosed with vitamin D deficiency and commenced on a high-dose vitamin D supplement (3000 units) once weekly.    The patient is sexually active with one partner and utilizes Nexplanon for contraception, which she plans to remove at the end of December 2024. She experiences regular menstrual cycles but reports significant pelvic pain post-coitus. This issue has been persistent, and she was previously prescribed oral contraceptives to manage the pain. Recently, the pain has intensified, prompting her to consider scheduling an appointment with her OB/GYN. She denies any abnormal vaginal discharge and does not suspect pregnancy. She has never used an intrauterine device (IUD). Her last Pap smear was conducted earlier this  year. She has biannual visits with her OB/GYN due to HPV-like symptoms. She has a history of ovarian cysts, with one rupture occurring two years ago. She experiences bloating and severe dysmenorrhea, often accompanied by nausea. She has a history of positive HPV and is under close surveillance by her OB/GYN.    The patient contracted COVID-19 in 2020, which progressed to pneumonia.    She is currently on a low dose of Lexapro, having transitioned from Prozac 40 mg. She is contemplating switching from Lexapro to Cymbalta. She has a history of substance use, including cocaine, but is currently attempting cessation. She does not require any medication refills at this time.    The patient has glaucoma and regularly consults with an ophthalmologist. She wears contact lenses and glasses and has astigmatism.    She reports xerostomia and has observed white patches on her tongue since initiating her current medications. She does not use a humidifier. She has regular bowel movements without constipation. She was previously diagnosed with polycystic ovary syndrome (PCOS) but has not experienced recent issues. She has noticed some hair growth but nothing significant.    Her surgical history includes an appendectomy and a successful procedure for hyperhidrosis. She denies any urinary symptoms, fever, chills, or flank pain.    SOCIAL HISTORY  She does not smoke tobacco or nicotine. She smokes weed especially for pain. She has done psychotropic drugs. She has done cocaine.    FAMILY HISTORY  Her maternal grandmother passed away from breast cancer. Her paternal grandfather's whole family has cholesterol, high blood pressure, diabetes. Her brother has asthma. Her mother has asthma.    IMMUNIZATIONS  She has received her tetanus vaccine. She has received her HPV vaccine.    Health Maintenance: Completed    Objective:     Exam:  /70 (BP Location: Right arm, Patient Position: Sitting, BP Cuff Size: Adult)   Pulse 67   Temp  36.2 °C (97.1 °F) (Temporal)   Resp 16   Ht 1.524 m (5')   Wt 72 kg (158 lb 12.8 oz)   SpO2 97%   BMI 31.01 kg/m²  Body mass index is 31.01 kg/m².    Physical Exam  Vitals reviewed.   Constitutional:       Appearance: Normal appearance.   HENT:      Head: Normocephalic and atraumatic.      Right Ear: External ear normal.      Left Ear: External ear normal.      Nose: Nose normal.   Cardiovascular:      Rate and Rhythm: Normal rate and regular rhythm.      Pulses: Normal pulses.      Heart sounds: Normal heart sounds. No murmur heard.  Pulmonary:      Effort: Pulmonary effort is normal. No respiratory distress.      Breath sounds: Normal breath sounds. No wheezing.   Abdominal:      General: Abdomen is flat.      Palpations: Abdomen is soft.   Skin:     General: Skin is warm and dry.   Neurological:      Mental Status: She is alert and oriented to person, place, and time.   Psychiatric:         Mood and Affect: Mood normal.         Behavior: Behavior normal.             Results  Laboratory Studies  Vitamin D level was low.    Assessment & Plan:     1. Healthcare maintenance  Lipid Profile    HEMOGLOBIN A1C    TSH WITH REFLEX TO FT4      2. Screen for sexually transmitted diseases  HIV AG/AB COMBO ASSAY SCREENING      3. Need for hepatitis C screening test  HEP C VIRUS ANTIBODY      4. Screening for thyroid disorder  TSH WITH REFLEX TO FT4      5. Screening for lipid disorders  Lipid Profile      6. Screening for diabetes mellitus  HEMOGLOBIN A1C      7. Need for vaccination  INFLUENZA VACCINE TRI INJ (PF)       8. Psoriatic arthritis (HCC)        9. Plaque psoriasis        10. Obesity (BMI 30-39.9)  Patient identified as having weight management issue.  Appropriate orders and counseling given.          Assessment & Plan  1. Psoriatic Arthritis.  She has been on a monoclonal antibody injectable for 2 years, prescribed by her rheumatologist. She is advised to continue her current treatment with Cimzia. She is  trying to wean off all other medications except Cimzia.     2. Vitamin D Deficiency.  She is currently on a high dose of vitamin D, 3000 units once a week, due to a deficiency identified a few weeks ago. Her vitamin D levels will be rechecked.    3. Pelvic Pain.  She reports significant pelvic pain after intercourse, which has recently worsened. She is advised to schedule an appointment with her OB/GYN for further evaluation. She has a history of ovarian cysts, including a rupture 2 years ago. An ultrasound may be repeated if recommended by her OB/GYN.    4. Depression.  She is currently on a low dose of Lexapro after switching from Prozac. Her rheumatologist suggested switching to Cymbalta to help manage pain associated with her arthritis. She discussed this with her psychiatrist and is considering the switch. Cymbalta will be considered if her pain persists.    5. Health Maintenance.  She is due for additional screenings, including thyroid, A1c, and cholesterol tests. HIV and hep C screenings will also be conducted. She will receive her influenza vaccine today and is advised to schedule her COVID-19 booster at a local pharmacy. She should verify her pneumonia vaccine status on the [place] vaccine database and receive it if not previously administered. She had a cervical cancer screening earlier this year and will continue regular follow-ups with her OB/GYN.    6. Medication Management.  She is currently on Lexapro and Cimzia. She does not need any refills at this time.              Return in about 4 weeks (around 12/25/2024) for f/up nexplanon proc (40 mins) .    Please note that this dictation was created using voice recognition software. I have made every reasonable attempt to correct obvious errors, but I expect that there are errors of grammar and possibly content that I did not discover before finalizing the note.    Mary Ca MD  Family Medicine and Non - Operative Sports Medicine   [facility]  - [place]

## 2024-12-02 ENCOUNTER — PATIENT MESSAGE (OUTPATIENT)
Dept: RHEUMATOLOGY | Facility: MEDICAL CENTER | Age: 29
End: 2024-12-02
Payer: COMMERCIAL

## 2024-12-02 DIAGNOSIS — L40.50 PSORIATIC ARTHRITIS (HCC): ICD-10-CM

## 2024-12-11 ENCOUNTER — OFFICE VISIT (OUTPATIENT)
Dept: MEDICAL GROUP | Facility: PHYSICIAN GROUP | Age: 29
End: 2024-12-11
Payer: COMMERCIAL

## 2024-12-11 VITALS
HEIGHT: 59 IN | HEART RATE: 74 BPM | DIASTOLIC BLOOD PRESSURE: 68 MMHG | BODY MASS INDEX: 32.05 KG/M2 | OXYGEN SATURATION: 99 % | WEIGHT: 159 LBS | SYSTOLIC BLOOD PRESSURE: 108 MMHG | TEMPERATURE: 98.5 F

## 2024-12-11 DIAGNOSIS — Z30.9 ENCOUNTER FOR CONTRACEPTIVE MANAGEMENT, UNSPECIFIED TYPE: ICD-10-CM

## 2024-12-11 LAB
INT CON NEG: NEGATIVE
INT CON POS: POSITIVE
POC URINE PREGNANCY TEST: NEGATIVE

## 2024-12-11 PROCEDURE — 11982 REMOVE DRUG IMPLANT DEVICE: CPT | Performed by: FAMILY MEDICINE

## 2024-12-11 PROCEDURE — 81025 URINE PREGNANCY TEST: CPT | Performed by: FAMILY MEDICINE

## 2024-12-11 PROCEDURE — 99213 OFFICE O/P EST LOW 20 MIN: CPT | Performed by: FAMILY MEDICINE

## 2024-12-11 ASSESSMENT — FIBROSIS 4 INDEX: FIB4 SCORE: 0.5

## 2024-12-12 NOTE — PROGRESS NOTES
Nexplanon removal    Date/Time: 12/11/2024 5:02 PM    Performed by: Mary Ca M.D.  Authorized by: Mary Ca M.D.  Preparation: Patient was prepped and draped in the usual sterile fashion.  Local anesthesia used: yes  Anesthesia: local infiltration    Anesthesia:  Local anesthesia used: yes  Local Anesthetic: lidocaine 1% without epinephrine  Anesthetic total: 3 mL    Sedation:  Patient sedated: no    Patient tolerance: patient tolerated the procedure well with no immediate complications

## 2024-12-16 RX ORDER — PREDNISONE 5 MG/1
TABLET ORAL
Qty: 70 TABLET | Refills: 0 | Status: SHIPPED | OUTPATIENT
Start: 2024-12-16

## 2025-01-14 ENCOUNTER — TELEMEDICINE (OUTPATIENT)
Dept: RHEUMATOLOGY | Facility: MEDICAL CENTER | Age: 30
End: 2025-01-14
Attending: STUDENT IN AN ORGANIZED HEALTH CARE EDUCATION/TRAINING PROGRAM
Payer: COMMERCIAL

## 2025-01-14 VITALS — HEIGHT: 60 IN | WEIGHT: 150 LBS | BODY MASS INDEX: 29.45 KG/M2

## 2025-01-14 DIAGNOSIS — E55.9 VITAMIN D DEFICIENCY: ICD-10-CM

## 2025-01-14 DIAGNOSIS — G56.23 CUBITAL TUNNEL SYNDROME, BILATERAL: ICD-10-CM

## 2025-01-14 DIAGNOSIS — L40.50 PSORIATIC ARTHRITIS (HCC): ICD-10-CM

## 2025-01-14 DIAGNOSIS — R76.8 SEROLOGIC AUTOIMMUNITY: ICD-10-CM

## 2025-01-14 DIAGNOSIS — L40.0 PLAQUE PSORIASIS: ICD-10-CM

## 2025-01-14 DIAGNOSIS — D84.9 IMMUNOSUPPRESSED STATUS (HCC): ICD-10-CM

## 2025-01-14 PROCEDURE — 99214 OFFICE O/P EST MOD 30 MIN: CPT | Mod: 95 | Performed by: STUDENT IN AN ORGANIZED HEALTH CARE EDUCATION/TRAINING PROGRAM

## 2025-01-14 ASSESSMENT — FIBROSIS 4 INDEX: FIB4 SCORE: 0.5

## 2025-01-14 NOTE — PROGRESS NOTES
[facility] RHEUMATOLOGY  [address], [address], [place] [address]  Phone: [phone] ? Fax: [phone]  [facility]/Health-Services/Rheumatology    VIRTUAL VIDEO FOLLOW-UP VISIT NOTE    This evaluation was conducted via Microsoft Office Teams using secure and encrypted videoconferencing technology for virtual visit. The patient was in their home in the [facility]. The patient's identity was confirmed and verbal consent was obtained for this encounter.         Subjective     DATE OF SERVICE: 01/14/2025    PRIMARY CARE PROVIDER:  Mary Ca M.D.  [address]  [place] [id number]    PATIENT IDENTIFICATION:  Abril Bennett  [address] [place] [address]    YOB: 1995    MEDICAL RECORD NUMBER: [id number]         CHIEF COMPLAINT:   Chief Complaint   Patient presents with    Follow-Up     Psoriatic arthritis (HCC)       RHEUMATOLOGIC HISTORY:  Abril Bennett is a 29 y.o. female with pertinent history notable for psoriatic arthritis diagnosed in late 2022 (reportedly symptmatic since early 2021), plaque psoriasis since teenage years (confirmed on skin biopsy of right occipital scalp in 12/2022), hyperhidrosis since childhood s/p VATS sympathectomy in 10/2023 at [facility], livedo reticularis, anxiety and depression among other comorbidities. She initially presented on 5/12/23 to establish care for rheumatologic evaluation and management of her PsA in the setting of positive FARHANA with concern for other connective tissue disease. Reported variable course of symptoms including episodic pain/swelling of her hands, variable durations of morning stiffness, muscle pain with body aches, cold-induced red color changes of fingers, and chronic sweaty palms and armpits due to her hyperhidrosis. She previously managed with analgesics until she was started on Cimzia which had been helpful for her skin and joints.     Pertinent treatments: Cimzia 400>200 mg SC every 2  weeks (1/2023-present, effective).     Pertinent positive labs: Positive FARHANA 1:1280 homogenous pattern and anti-chromatin 23 with negative anti-histone (in 7/2023); anti-dsDNA 58<119 at 1:20<1:80 titer (in 10/2024<7/2023); low C4 of 13.8<14.3 with normal C3 of 109<124 and CH50 of 46.9 (in 10/2024<7/2023); elevated CRP 1.02 with normal ESR (in 10/2024); low vitamin D hydroxy 18<22 (in 10/2024<12/2022).     Pertinent negative labs: Negative HLA-B27, anti-TPO and anti-TG (in 3/2021), TSH (in 12/2022), QTB (in 3/2024), CBC, eGFR, creatinine, and LFTs (in 10/2024).     Pertinent XR imaging: Hands and Feet (in 4/2021) with no radiographic evidence of inflammatory or degenerative arthropathy.      INTERVAL HISTORY:  Reports interval history as noted on the questionnaire below or scanned under media tab.  Notably with bothersome new onset tingling/numbness of the forearms/fingers over the past 1-2 weeks.  Doing better otherwise after restarting Cimzia and taking high-dose vitamin D as previously prescribed.    REVIEW OF SYSTEMS:  Except as noted in the history above, relevant review of systems with emphasis on autoimmune rheumatic diseases was otherwise negative.      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) 05/12/2023    Immunosuppressed status (HCC) 05/12/2023       PAST MEDICAL HISTORY:  Past Medical History:   Diagnosis Date    Anxiety     Depression        PAST SURGICAL HISTORY:  Past Surgical History:   Procedure Laterality Date    APPENDECTOMY  2010       SOCIAL HISTORY:  Social History     Socioeconomic History    Marital status: Single     Spouse name: Not on file     Number of children: Not on file    Years of education: Not on file    Highest education level: Not on file   Occupational History    Not on file   Tobacco Use    Smoking status: Never    Smokeless tobacco: Never   Vaping Use    Vaping status: Never Used   Substance and Sexual Activity    Alcohol use: Yes    Drug use: No    Sexual activity: Not on file   Other Topics Concern    Not on file   Social History Narrative    Not on file     Social Drivers of Health     Financial Resource Strain: Not on file   Food Insecurity: Not on file   Transportation Needs: Not on file   Physical Activity: Not on file   Stress: Not on file   Social Connections: Not on file   Intimate Partner Violence: Not on file   Housing Stability: Not on file       FAMILY HISTORY:  Family History   Problem Relation Age of Onset    Lung Disease Mother     Hyperlipidemia Father     Hypertension Father     Diabetes Father     Lung Disease Brother     Diabetes Paternal Uncle     Hypertension Paternal Uncle     Breast Cancer Maternal Grandmother     Diabetes Paternal Grandmother     Hyperlipidemia Paternal Grandmother     Hypertension Paternal Grandmother     Diabetes Paternal Grandfather     Hyperlipidemia Paternal Grandfather     Hypertension Paternal Grandfather        MEDICATIONS:  Current Outpatient Medications   Medication Sig    escitalopram (LEXAPRO) 10 MG Tab Take 10 mg by mouth every day.    ergocalciferol (DRISDOL) 09120 UNIT capsule Take 1 Capsule by mouth every 7 days.    certolizumab pegol (CIMZIA, 2 SYRINGE,) 200 MG/ML Prefilled Syringe Kit Inject 200 mg under the skin every 14 days.    predniSONE (DELTASONE) 5 MG Tab Take 4 tablets every day for 7 days, then decrease by 1 tablet every 7 days until finished. Take in the morning with food. (Patient not taking: Reported on 1/14/2025)       ALLERGIES:   No Known Allergies    IMMUNIZATIONS:  Immunization History   Administered Date(s) Administered    9VHPV VACCINE 2-3 DOSE IM (GARDASIL 9)  08/06/2015, 08/06/2015    Dtap Vaccine 1997, 01/13/1998, 03/13/1998, 12/02/1998, 09/06/2001    HIB Vaccine (ACTHIB/HIBERIX) 12/02/1998, 02/02/1999    HPV Quadrivalent Vaccine (GARDASIL) - HISTORICAL DATA 05/05/2011, 05/05/2011    Hepatitis A Vaccine, Ped/Adol 02/13/2002, 08/15/2002    Hepatitis B Vaccine Adolescent/Pediatric 12/02/1998, 02/02/1999, 06/02/1999    IPV 09/06/2001    Influenza Seasonal Injectable - Historical Data 02/22/2011, 03/08/2011, 11/08/2013, 10/17/2014, 10/15/2015, 10/25/2016    Influenza Vaccine H1N1 - HISTORICAL DATA 11/07/2009    Influenza Vaccine Quad Inj (Pf) 12/18/2018, 09/30/2020, 10/14/2021, 10/05/2022    Influenza Vaccine Quad Inj (Preserved) 10/18/2017    Influenza split virus trivalent (PF) 03/08/2011, 11/27/2024    MENING VAC SERO B 2 DOSE SCHED IM (BEXSERO) 01/11/2018    MMR Vaccine 12/02/1998, 09/06/2001    Measles Vaccine - Historical Data 10/06/1998    Meningococcal Conjugate Vaccine MCV4 (Menactra) 09/10/2009, 08/06/2015, 08/06/2015    OPV TRIVALENT - HISTORICAL DATA (GIVEN PRIOR TO MAY 2016) 1997, 01/13/1998, 03/13/1998    Tdap Vaccine 09/10/2009, 09/03/2019, 09/03/2019    Tuberculin Skin Test 09/05/2016, 10/17/2017, 10/07/2019    Varicella Vaccine Live 02/11/2000, 09/10/2009            Objective     Vital Signs: Ht 1.524 m (5')   Wt 68 kg (150 lb) Body mass index is 29.29 kg/m².    General: Appears well and comfortable  Eyes: Not applicable  ENT: Not applicable  Head/Neck: Not applicable  Cardiovascular: Not applicable  Respiratory: Breathing quiet and unlabored  Gastrointestinal: Not applicable  Integumentary: Not applicable  Musculoskeletal: Not applicable  Neurologic: Not applicable  Psychiatric: Mood and affect appropriate      LABORATORY RESULTS REVIEWED AND INTERPRETED:  Lab Results   Component Value Date/Time    CREACTPROT 1.02 (H) 10/14/2024 05:11 PM    SEDRATEWES 12 10/14/2024 05:11 PM     Lab Results   Component Value Date/Time    D0DMKKFWFWI 109.4  10/14/2024 05:11 PM    K5VFSQTKRPA 13.8 (L) 10/14/2024 05:11 PM     Lab Results   Component Value Date/Time    CCPANTIBODY 3 03/04/2021 10:48 AM    YQBA05HQXI Negative 03/04/2021 10:49 AM     Lab Results   Component Value Date/Time    ANTINUCAB Detected (A) 07/11/2023 05:41 PM     Lab Results   Component Value Date/Time    ANTIDNADS SEE BELOW 10/14/2024 05:11 PM    SMITHAB 2 07/11/2023 05:41 PM    RNPAB 4 07/11/2023 05:41 PM    YWNGTYI27 2 07/11/2023 05:41 PM    SSA60 4 07/11/2023 05:41 PM    SSA52 6 07/11/2023 05:41 PM    ANTISSBSJ 1 07/11/2023 05:41 PM    JO1AB 1 07/11/2023 05:41 PM     Lab Results   Component Value Date/Time    MICROSOMALA 0.7 03/04/2021 10:48 AM    MICROSOMALA <9.0 03/04/2021 10:48 AM    ANTITHYROGL <0.9 03/04/2021 10:48 AM     Lab Results   Component Value Date/Time    25HYDROXY 18 (L) 10/14/2024 05:11 PM     Lab Results   Component Value Date/Time    PROTHROMBTM 11.7 04/26/2009 12:20 AM    INR 1.02 04/26/2009 12:20 AM     Lab Results   Component Value Date/Time    WBC 8.2 10/14/2024 05:11 PM    RBC 4.56 10/14/2024 05:11 PM    HEMOGLOBIN 13.6 10/14/2024 05:11 PM    HEMATOCRIT 41.3 10/14/2024 05:11 PM    MCV 90.6 10/14/2024 05:11 PM    MCH 29.8 10/14/2024 05:11 PM    MCHC 32.9 10/14/2024 05:11 PM    RDW 42.6 10/14/2024 05:11 PM    PLATELETCT 290 10/14/2024 05:11 PM    MPV 11.0 10/14/2024 05:11 PM    NEUTS 4.60 10/14/2024 05:11 PM    LYMPHOCYTES 35.70 10/14/2024 05:11 PM    MONOCYTES 6.30 10/14/2024 05:11 PM    EOSINOPHILS 1.20 10/14/2024 05:11 PM    BASOPHILS 0.40 10/14/2024 05:11 PM    HYPOCHROMIA 1+ 12/10/2012 11:00 AM     Lab Results   Component Value Date/Time    ASTSGOT 25 10/14/2024 05:11 PM    ALTSGPT 22 10/14/2024 05:11 PM    ALKPHOSPHAT 82 10/14/2024 05:11 PM    TBILIRUBIN 0.3 10/14/2024 05:11 PM    TOTPROTEIN 7.0 10/14/2024 05:11 PM    ALBUMIN 4.0 10/14/2024 05:11 PM     Lab Results   Component Value Date/Time    SODIUM 139 10/14/2024 05:11 PM    POTASSIUM 3.8 10/14/2024 05:11 PM     CHLORIDE 102 10/14/2024 05:11 PM    CO2 23 10/14/2024 05:11 PM    GLUCOSE 77 10/14/2024 05:11 PM    BUN 15 10/14/2024 05:11 PM    CREATININE 0.56 10/14/2024 05:11 PM    CREATININE 0.5 04/26/2009 12:20 AM    BUNCREATRAT 10.0 09/20/2023 07:28 PM    CALCIUM 9.2 10/14/2024 05:11 PM     Lab Results   Component Value Date/Time    COLORURINE Lt. Yellow 12/10/2012 09:20 AM    SPECGRAVITY 1.016 12/10/2012 09:20 AM    PHURINE 5.5 12/10/2012 09:20 AM    GLUCOSEUR Negative 12/10/2012 09:20 AM    KETONES Negative 12/10/2012 09:20 AM    PROTEINURIN Negative 12/10/2012 09:20 AM     Lab Results   Component Value Date/Time    HBA1C 5.4 12/07/2022 07:27 AM       RADIOLOGY RESULTS REVIEWED AND INTERPRETED:  Results for orders placed during the hospital encounter of 04/07/21    DX-JOINT SURVEY-FEET SINGLE VIEW    Impression  Unremarkable bilateral PA view if the feet. No radiographic evidence for active inflammatory arthritis at this time.    This exam was performed in an orthopedic clinic of the [facility]. This interpretation is performed in addition to the interpretation by the ordering provider.    Results for orders placed during the hospital encounter of 04/07/21    DX-JOINT SURVEY-HANDS SINGLE VIEW    Impression  Unremarkable examination of hands. No radiographic evidence for active inflammatory arthritis at this time.    This exam was performed in an orthopedic clinic of the [facility]. This interpretation is performed in addition to the interpretation by the ordering provider.      All relevant laboratory and imaging results reported on this note were reviewed and interpreted by me.         Assessment & Plan     Abril Bennett is a 29 y.o. female with history as noted above whose presentation merits the following clinical impressions and recommendations:    1. Psoriatic arthritis (HCC)  Active problem that appeared to have a fluctuating clinical course which previously raised some concern for  possible declining effectiveness of Cimzia, though now appears to be responding well again after resuming it this month, so would continue on this regimen.  - CRP and ESR (previously ordered)  - Continue Cimzia 200 mg SC every 2 weeks   - In the context of her serologic autoimmunity, could plan for hydroxychloroquine 200 mg, but would need to switch from Lexapro (higher risk of arrhythmia with concomitant administration) to Cymbalta (low risk of arrhythmia and also approved for chronic musculoskeletal pain)     2. Plaque psoriasis  Clinically low disease activity that appears to remain well-controlled on the current regimen of Cimzia, so no need for modification of treatment.  - Continue Cimzia 200 mg SC every 2 weeks    3. Cubital tunnel syndrome, bilateral  Presumptive diagnosis based on her reported symptoms confirmed on visual description via the video.  - Referral to Neurodiagnostics (EEG,EP,EMG/NCS/DBS)    4. 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 Initially raised some suspicion for TNF-alpha inhibitor-induced autoimmunity, but seems unlikely as her FARHANA was initially positive in 12/2022 prior to initiation of Cimzia in 1/2023.  - In the context of her psoriatic arthritis, could plan for hydroxychloroquine 200 mg, but would need to switch from Lexapro (higher risk of arrhythmia with concomitant administration) to Cymbalta (low risk of arrhythmia and also approved for chronic musculoskeletal pain)     5. Vitamin D deficiency  Hypovitaminosis D or deficiency can contribute to diffuse musculoskeletal aches, fatigue, malaise, and risk of decrease in bone density predisposing to osteopenia, especially in the setting of a rheumatic disease, so need supplementation and follow-up retesting.  - Continue vitamin D 50,000 IU weekly  for 12 weeks as prescribed with 2 refills     6. Immunosuppressed status (HCC)  High risk immunosuppressant use requiring routine monitoring labs prior to every visit to assess for possible side effects including but not limited to myelotoxicity, hepatotoxicity, and opportunistic infections.  - CBC and CMP (previously ordered)  - Need to ascertain age-appropriate vaccines in addition to the ones listed above under immunizations      The above assessment and plan were discussed with the patient who acknowledged understanding of the plan.    FOLLOW-UP: Return in about 3 months (around 4/21/2025) for Short.         Thank you for the opportunity to participate in the care of Abril Bennett.    John Goodwin MD, MS, FACR  Rheumatologist, [facility], [facility]   of Clinical Medicine, Department of Internal Medicine  [facility] [employer]

## 2025-01-14 NOTE — PATIENT INSTRUCTIONS
[facility]    Below are important guidelines to help you navigate your health care needs and assist us in caring for you safely and effectively. We encourage you to carefully read and understand this information and adhere to them accordingly.    [facility] Messaging and Phone Calls:  Diagnosis and Treatment - For a detailed explanation of your condition and treatment plan from today's visit, refer to the visit note on [facility] via the following steps:  Log in to [facility] and click on “Visits” at the top.  Scroll down to “Past Visits” under Appointments.  Click on “View Notes” under the appropriate visit date.  Questions or Concerns - MyChart messaging is for non-urgent matters that do not require immediate attention and should be brief with no more than two questions or concerns. If you have multiple questions or concerns, we ask that you schedule an appointment to have them properly addressed.  Response to Messages - [facility] messages are addressed throughout the week depending on clinical availability, so we ask that you allow up to one week for a response.  Phone Calls and Voicemails - Phone calls and voicemail messages are reserved for time-sensitive matters that cannot wait to be addressed via [facility]. We ask that you refrain from calling the office multiple times or leaving multiple voicemails regarding the same issue as doing so may lead to delays in response time.  Urgent Issues - For urgent medical matters or medical emergencies that cannot wait, you are advised to go to your nearest Urgent Care or Emergency Department for immediate attention.    Laboratory Tests and Imaging Studies:  Future Lab and Imaging Orders - We ask that you get your lab tests and imaging studies done no later than one week before your follow-up visit unless instructed otherwise.  Results Communication - You may see some test results marked as “abnormal” that are not necessarily significant or concerning. If  there are significant abnormalities on your test results that warrant further action, you will be notified via MyChart or phone call, otherwise they will be addressed at your follow-up visit.    Prescriptions and Refill Requests:  General Prescriptions (e.g. prednisone, hydroxychloroquine, leflunomide, methotrexate, etc.) - These are sent to Retail Pharmacies, so all refill requests of these medications should be directed to your local pharmacy.  Specialty Prescriptions (e.g. Enbrel, Humira, Cosentyx, Xeljanz, etc.) - These are sent to Specialty Pharmacies, so all refill requests of these medications should be directed to your designated specialty pharmacy.  Infusion Prescriptions (e.g. Remicade, Simponi Aria, Rituxan, Saphnelo, etc.) - These are sent to Outpatient Infusion Centers, so all scheduling requests of these medications should be directed to your local infusion center.    Medication Risks and Adverse Effects:  Immunosuppressed Status - Steroids and antirheumatic drugs are immunosuppressants, so they increase the risk of infections and can have side effects on various organ systems in your body, though most of them are uncommon.  Potential Side Effects - Be sure to read the drug package inserts to learn about the potential side effects of your medications before you start taking them and take them exactly as prescribed unless instructed otherwise.  In Case of Side Effects - If you experience any significant side effects, stop taking the medication immediately and promptly notify the prescriber. Depending on the severity of the side effects, consider going to an Urgent Care or Emergency Department for immediate attention.    Immunizations and Health Screening:  Vaccinations - If you are on immunosuppressive therapy, it is important that you are up to date on age-appropriate immunizations, particularly shingles and pneumonia vaccines, which you can request from your primary care provider or from us at your  next appointment.  Screening Tests - It is also important that you are up to date on age-appropriate screening tests, such as pap smear, mammography, and colonoscopy, which you can request from your primary care provider.    Educational and Supportive Resources:  [facility] (www.National Institutes of Health (NIH).org/Health-Services/Rheumatology) - Visit our website to learn more about your condition and other rheumatic diseases, and gain access to many helpful resources for them.  Disposal of Old Medications (www.miguel.gov/everyday-takeback-day) - Visit the Drug Enforcement Administration website to find a nearby location where you can properly dispose of old medications you no longer need.  Disposal of Used [place] (www.safeneedledisposal.org) - Visit the Safe Needle Disposal Organization website to find a nearby location where you can properly dispose of used needles from your injectable medications.

## 2025-02-13 DIAGNOSIS — L40.50 PSORIATIC ARTHRITIS (HCC): ICD-10-CM

## 2025-02-13 DIAGNOSIS — L40.0 PLAQUE PSORIASIS: ICD-10-CM

## 2025-02-13 RX ORDER — CERTOLIZUMAB PEGOL 200 MG/ML
INJECTION, SOLUTION SUBCUTANEOUS
Qty: 2 EACH | Refills: 11 | Status: SHIPPED | OUTPATIENT
Start: 2025-02-13

## 2025-03-06 ENCOUNTER — APPOINTMENT (OUTPATIENT)
Dept: NEUROLOGY | Facility: MEDICAL CENTER | Age: 30
End: 2025-03-06
Attending: SPECIALIST
Payer: COMMERCIAL

## 2025-04-02 ENCOUNTER — OFFICE VISIT (OUTPATIENT)
Dept: RHEUMATOLOGY | Facility: MEDICAL CENTER | Age: 30
End: 2025-04-02
Attending: STUDENT IN AN ORGANIZED HEALTH CARE EDUCATION/TRAINING PROGRAM
Payer: COMMERCIAL

## 2025-04-02 VITALS
BODY MASS INDEX: 30.43 KG/M2 | DIASTOLIC BLOOD PRESSURE: 64 MMHG | WEIGHT: 155 LBS | RESPIRATION RATE: 16 BRPM | HEIGHT: 60 IN | TEMPERATURE: 98.1 F | SYSTOLIC BLOOD PRESSURE: 116 MMHG | OXYGEN SATURATION: 98 % | HEART RATE: 68 BPM

## 2025-04-02 DIAGNOSIS — E55.9 VITAMIN D DEFICIENCY: ICD-10-CM

## 2025-04-02 DIAGNOSIS — L40.50 PSORIATIC ARTHRITIS (HCC): ICD-10-CM

## 2025-04-02 DIAGNOSIS — D84.9 IMMUNOSUPPRESSED STATUS (HCC): ICD-10-CM

## 2025-04-02 DIAGNOSIS — L40.0 PLAQUE PSORIASIS: ICD-10-CM

## 2025-04-02 DIAGNOSIS — G56.23 CUBITAL TUNNEL SYNDROME, BILATERAL: ICD-10-CM

## 2025-04-02 DIAGNOSIS — R76.8 SEROLOGIC AUTOIMMUNITY: ICD-10-CM

## 2025-04-02 PROCEDURE — 99212 OFFICE O/P EST SF 10 MIN: CPT | Performed by: STUDENT IN AN ORGANIZED HEALTH CARE EDUCATION/TRAINING PROGRAM

## 2025-04-02 PROCEDURE — 99214 OFFICE O/P EST MOD 30 MIN: CPT | Performed by: STUDENT IN AN ORGANIZED HEALTH CARE EDUCATION/TRAINING PROGRAM

## 2025-04-02 PROCEDURE — 3078F DIAST BP <80 MM HG: CPT | Performed by: STUDENT IN AN ORGANIZED HEALTH CARE EDUCATION/TRAINING PROGRAM

## 2025-04-02 PROCEDURE — 3074F SYST BP LT 130 MM HG: CPT | Performed by: STUDENT IN AN ORGANIZED HEALTH CARE EDUCATION/TRAINING PROGRAM

## 2025-04-02 ASSESSMENT — FIBROSIS 4 INDEX: FIB4 SCORE: 0.5

## 2025-04-02 NOTE — PATIENT INSTRUCTIONS
[facility] RHEUMATOLOGY AFTER VISIT GUIDE    Below are important guidelines to help you navigate your health care needs and assist us in caring for you safely and effectively. We encourage you to carefully read and understand this information and adhere to them accordingly.    [employer] Messaging and Phone Calls:  Diagnosis and Treatment - For a detailed explanation of your condition and treatment plan from today's visit, refer to the visit note on [employer] via the following steps:  Log in to [employer] and click on “Visits” at the top.  Scroll down to “Past Visits” under Appointments.  Click on “View Notes” under the appropriate visit date.  Questions or Concerns - MyChart messaging is for non-urgent matters that do not require immediate attention and should be brief with no more than two questions or concerns. If you have multiple questions or concerns, we ask that you schedule an appointment to have them properly addressed.  Response to Messages - [employer] messages are addressed throughout the week depending on clinical availability, so we ask that you allow up to one week for a response.  Phone Calls and Voicemails - Phone calls and voicemail messages are reserved for time-sensitive matters that cannot wait to be addressed via [employer]. We ask that you refrain from calling the office multiple times or leaving multiple voicemails regarding the same issue as doing so may lead to delays in response time.  Urgent Issues - For urgent medical matters or medical emergencies that cannot wait, you are advised to go to your nearest Urgent Care or Emergency Department for immediate attention.    Laboratory Tests and Imaging Studies:  Future Lab and Imaging Orders - We ask that you get your lab tests and imaging studies done no later than one week before your follow-up visit unless instructed otherwise.  Results Communication - You may see some test results marked as “abnormal” that are not necessarily significant or concerning. If  there are significant abnormalities on your test results that warrant further action, you will be notified via MyChart or phone call, otherwise they will be addressed at your follow-up visit.    Prescriptions and Refill Requests:  General Prescriptions (e.g. prednisone, hydroxychloroquine, leflunomide, methotrexate, etc.) - These are sent to Retail Pharmacies, so all refill requests of these medications should be directed to your local pharmacy.  Specialty Prescriptions (e.g. Enbrel, Humira, Cosentyx, Xeljanz, etc.) - These are sent to Specialty Pharmacies, so all refill requests of these medications should be directed to your designated specialty pharmacy.  Infusion Prescriptions (e.g. Remicade, Simponi Aria, Rituxan, Saphnelo, etc.) - These are sent to Outpatient Infusion Centers, so all scheduling requests of these medications should be directed to your local infusion center.    Medication Risks and Adverse Effects:  Immunosuppressed Status - Steroids and antirheumatic drugs are immunosuppressants, so they increase the risk of infections and can have side effects on various organ systems in your body, though most of them are uncommon.  Potential Side Effects - Be sure to read the drug package inserts to learn about the potential side effects of your medications before you start taking them and take them exactly as prescribed unless instructed otherwise.  In Case of Side Effects - If you experience any significant side effects, stop taking the medication immediately and promptly notify the prescriber. Depending on the severity of the side effects, consider going to an Urgent Care or Emergency Department for immediate attention.    Immunizations and Health Screening:  Vaccinations - If you are on immunosuppressive therapy, it is important that you are up to date on age-appropriate immunizations, particularly shingles and pneumonia vaccines, which you can request from your primary care provider or from us at your  next appointment.  Screening Tests - It is also important that you are up to date on age-appropriate screening tests, such as pap smear, mammography, and colonoscopy, which you can request from your primary care provider.    Educational and Supportive Resources:  [employer] [facility] ([email or web address]) - Visit our website to learn more about your condition and other rheumatic diseases, and gain access to many helpful resources for them.  Disposal of Old Medications (www.miguel.gov/everyday-takeback-day) - Visit the Drug Enforcement Administration website to find a nearby location where you can properly dispose of old medications you no longer need.  Disposal of Used Alcova (www.safeneedledisposal.org) - Visit the Safe Needle Disposal Organization website to find a nearby location where you can properly dispose of used needles from your injectable medications.

## 2025-04-02 NOTE — PROGRESS NOTES
[facility] RHEUMATOLOGY  [address], [address], [place] [address]  Phone: [phone] ? Fax: [phone]  [facility]/Health-Services/Rheumatology    FOLLOW-UP VISIT NOTE         Subjective     DATE OF SERVICE: 04/02/2025    PRIMARY CARE PROVIDER:  Mary Ca M.D.  [address]  [place] [id number]    PATIENT IDENTIFICATION:  Abril Bennett  [address] [place] [address]    YOB: 1995    MEDICAL RECORD NUMBER: [id number]         CHIEF COMPLAINT:   Chief Complaint   Patient presents with    Follow-Up     Psoriatic arthritis       RHEUMATOLOGIC HISTORY:  Abril Bennett is a 29 y.o. female with pertinent history notable for psoriatic arthritis diagnosed in late 2022 (reportedly symptmatic since early 2021), plaque psoriasis since teenage years (confirmed on skin biopsy of right occipital scalp in 12/2022), hyperhidrosis since childhood s/p VATS sympathectomy in 10/2023 at [facility], livedo reticularis, anxiety and depression among other comorbidities. She initially presented on 5/12/23 to establish care for rheumatologic evaluation and management of her PsA in the setting of positive FARHANA with concern for other connective tissue disease. Reported variable course of symptoms including episodic pain/swelling of her hands, variable durations of morning stiffness, muscle pain with body aches, cold-induced red color changes of fingers, and chronic sweaty palms and armpits due to her hyperhidrosis. She previously managed with analgesics until she was started on Cimzia which had been helpful for her skin and joints.     Pertinent treatments: Cimzia 400>200 mg SC every 2 weeks (1/2023-present, effective).     Pertinent positive labs: Positive FARHANA 1:1280 homogenous pattern and anti-chromatin 23 with negative anti-histone (in 7/2023); anti-dsDNA 58<119 at 1:20<1:80 titer (in 10/2024<7/2023); low C4 of 13.8<14.3 with normal C3 of 109<124 and CH50 of 46.9 (in  10/2024<7/2023); elevated CRP 1.02 with normal ESR (in 10/2024); low vitamin D hydroxy 18<22 (in 10/2024<12/2022).     Pertinent negative labs: Negative HLA-B27, anti-TPO and anti-TG (in 3/2021), TSH (in 12/2022), QTB (in 3/2024), CBC, eGFR, creatinine, and LFTs (in 10/2024).     Pertinent XR imaging: Hands and Feet (in 4/2021) with no radiographic evidence of inflammatory or degenerative arthropathy..      INTERVAL HISTORY:  Reports interval history as noted on the questionnaire below or scanned under media tab.  Patient reports she has stopped her medications with exception to Cimzia; her PCP and psychiatrist are aware.   She is planning to undergo Ketamine infusion treatment for depression.   Nexplanon birth control was removed, and she is ok to become pregnant if it happens.               REVIEW OF SYSTEMS:  Except as noted in the history above, relevant review of systems with emphasis on autoimmune rheumatic diseases was otherwise negative.      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) 05/12/2023    Immunosuppressed status (HCC) 05/12/2023     PAST MEDICAL HISTORY:  Past Medical History:   Diagnosis Date    Anxiety     Depression      PAST SURGICAL HISTORY:  Past Surgical History:   Procedure Laterality Date    APPENDECTOMY  2010     SOCIAL HISTORY:  Social History     Socioeconomic History    Marital status: Single     Spouse name: Not on file    Number of children: Not on file    Years of education: Not on file    Highest education level: Not on file   Occupational History    Not on file   Tobacco Use    Smoking status: Never    Smokeless tobacco: Never   Vaping  Use    Vaping status: Never Used   Substance and Sexual Activity    Alcohol use: Yes    Drug use: No    Sexual activity: Not on file   Other Topics Concern    Not on file   Social History Narrative    Not on file     Social Drivers of Health     Financial Resource Strain: Not on file   Food Insecurity: Not on file   Transportation Needs: Not on file   Physical Activity: Not on file   Stress: Not on file   Social Connections: Not on file   Intimate Partner Violence: Not on file   Housing Stability: Not on file     FAMILY HISTORY:  Family History   Problem Relation Age of Onset    Lung Disease Mother     Hyperlipidemia Father     Hypertension Father     Diabetes Father     Lung Disease Brother     Diabetes Paternal Uncle     Hypertension Paternal Uncle     Breast Cancer Maternal Grandmother     Diabetes Paternal Grandmother     Hyperlipidemia Paternal Grandmother     Hypertension Paternal Grandmother     Diabetes Paternal Grandfather     Hyperlipidemia Paternal Grandfather     Hypertension Paternal Grandfather      MEDICATIONS:  Current Outpatient Medications   Medication Sig    CIMZIA, 2 SYRINGE, 200 MG/ML Prefilled Syringe Kit INJECT 200MG (1 SYRINGE)  SUBCUTANEOUSLY EVERY 2 WEEKS    predniSONE (DELTASONE) 5 MG Tab Take 4 tablets every day for 7 days, then decrease by 1 tablet every 7 days until finished. Take in the morning with food. (Patient not taking: Reported on 4/2/2025)    escitalopram (LEXAPRO) 10 MG Tab Take 10 mg by mouth every day. (Patient not taking: Reported on 4/2/2025)    ergocalciferol (DRISDOL) 33042 UNIT capsule Take 1 Capsule by mouth every 7 days. (Patient not taking: Reported on 4/2/2025)     ALLERGIES:   No Known Allergies    IMMUNIZATIONS:  Immunization History   Administered Date(s) Administered    9VHPV VACCINE 2-3 DOSE IM (GARDASIL 9) 08/06/2015, 08/06/2015    Dtap Vaccine 1997, 01/13/1998, 03/13/1998, 12/02/1998, 09/06/2001    HIB Vaccine (ACTHIB/HIBERIX) 12/02/1998, 02/02/1999     HPV Quadrivalent Vaccine (GARDASIL) - HISTORICAL DATA 05/05/2011, 05/05/2011    Hepatitis A Vaccine, Ped/Adol 02/13/2002, 08/15/2002    Hepatitis B Vaccine Adolescent/Pediatric 12/02/1998, 02/02/1999, 06/02/1999    IPV 09/06/2001    Influenza Seasonal Injectable - Historical Data 02/22/2011, 03/08/2011, 11/08/2013, 10/17/2014, 10/15/2015, 10/25/2016    Influenza Vaccine H1N1 - HISTORICAL DATA 11/07/2009    Influenza Vaccine Quad Inj (Pf) 12/18/2018, 09/30/2020, 10/14/2021, 10/05/2022    Influenza Vaccine Quad Inj (Preserved) 10/18/2017    Influenza split virus trivalent (PF) 03/08/2011, 11/27/2024    MENING VAC SERO B 2 DOSE SCHED IM (BEXSERO) 01/11/2018    MMR Vaccine 12/02/1998, 09/06/2001    Measles Vaccine - Historical Data 10/06/1998    Meningococcal Conjugate Vaccine MCV4 (Menactra) 09/10/2009, 08/06/2015, 08/06/2015    OPV TRIVALENT - HISTORICAL DATA (GIVEN PRIOR TO MAY 2016) 1997, 01/13/1998, 03/13/1998    Tdap Vaccine 09/10/2009, 09/03/2019, 09/03/2019    Tuberculin Skin Test 09/05/2016, 10/17/2017, 10/07/2019    Varicella Vaccine Live 02/11/2000, 09/10/2009          Objective     Vital Signs: /64 (BP Location: Left arm, Patient Position: Sitting, BP Cuff Size: Adult)   Pulse 68   Temp 36.7 °C (98.1 °F) (Temporal)   Resp 16   Ht 1.524 m (5')   Wt 70.3 kg (155 lb)   SpO2 98% Body mass index is 30.27 kg/m².    General: Appears well and comfortable  Eyes: No scleral or conjunctival lesions  ENT: No apparent oral, nasal, or ear lesions  Head/Neck: No apparent scalp or neck lesions  Cardiovascular: Regular rate and rhythm  Respiratory: Breathing quiet and unlabored  Gastrointestinal: No apparent organomegaly or abdominal masses  Integumentary: No significant cutaneous lesions or dyspigmentation  Musculoskeletal: Minimal joint tenderness of bilateral wrists and elbows; no swelling, warmth, erythema, or overt synovitis; no significant restriction in range of motion of joints  examined  Neurologic: No focal sensory or motor deficits  Psychiatric: Mood and affect appropriate      LABORATORY RESULTS REVIEWED AND INTERPRETED:  Lab Results   Component Value Date/Time    CREACTPROT 1.02 (H) 10/14/2024 05:11 PM    SEDRATEWES 12 10/14/2024 05:11 PM     Lab Results   Component Value Date/Time    A8RMNZUQTZJ 109.4 10/14/2024 05:11 PM    R6BAZBPLYSQ 13.8 (L) 10/14/2024 05:11 PM     Lab Results   Component Value Date/Time    CCPANTIBODY 3 03/04/2021 10:48 AM    PSAI69HZEY Negative 03/04/2021 10:49 AM     Lab Results   Component Value Date/Time    ANTINUCAB Detected (A) 07/11/2023 05:41 PM     Lab Results   Component Value Date/Time    ANTIDNADS SEE BELOW 10/14/2024 05:11 PM    SMITHAB 2 07/11/2023 05:41 PM    RNPAB 4 07/11/2023 05:41 PM    ZVARWEX30 2 07/11/2023 05:41 PM    SSA60 4 07/11/2023 05:41 PM    SSA52 6 07/11/2023 05:41 PM    ANTISSBSJ 1 07/11/2023 05:41 PM    JO1AB 1 07/11/2023 05:41 PM     Lab Results   Component Value Date/Time    MICROSOMALA 0.7 03/04/2021 10:48 AM    MICROSOMALA <9.0 03/04/2021 10:48 AM    ANTITHYROGL <0.9 03/04/2021 10:48 AM     Lab Results   Component Value Date/Time    25HYDROXY 18 (L) 10/14/2024 05:11 PM     Lab Results   Component Value Date/Time    PROTHROMBTM 11.7 04/26/2009 12:20 AM    INR 1.02 04/26/2009 12:20 AM     Lab Results   Component Value Date/Time    WBC 8.2 10/14/2024 05:11 PM    RBC 4.56 10/14/2024 05:11 PM    HEMOGLOBIN 13.6 10/14/2024 05:11 PM    HEMATOCRIT 41.3 10/14/2024 05:11 PM    MCV 90.6 10/14/2024 05:11 PM    MCH 29.8 10/14/2024 05:11 PM    MCHC 32.9 10/14/2024 05:11 PM    RDW 42.6 10/14/2024 05:11 PM    PLATELETCT 290 10/14/2024 05:11 PM    MPV 11.0 10/14/2024 05:11 PM    NEUTS 4.60 10/14/2024 05:11 PM    LYMPHOCYTES 35.70 10/14/2024 05:11 PM    MONOCYTES 6.30 10/14/2024 05:11 PM    EOSINOPHILS 1.20 10/14/2024 05:11 PM    BASOPHILS 0.40 10/14/2024 05:11 PM    HYPOCHROMIA 1+ 12/10/2012 11:00 AM     Lab Results   Component Value Date/Time     ASTSGOT 25 10/14/2024 05:11 PM    ALTSGPT 22 10/14/2024 05:11 PM    ALKPHOSPHAT 82 10/14/2024 05:11 PM    TBILIRUBIN 0.3 10/14/2024 05:11 PM    TOTPROTEIN 7.0 10/14/2024 05:11 PM    ALBUMIN 4.0 10/14/2024 05:11 PM     Lab Results   Component Value Date/Time    SODIUM 139 10/14/2024 05:11 PM    POTASSIUM 3.8 10/14/2024 05:11 PM    CHLORIDE 102 10/14/2024 05:11 PM    CO2 23 10/14/2024 05:11 PM    GLUCOSE 77 10/14/2024 05:11 PM    BUN 15 10/14/2024 05:11 PM    CREATININE 0.56 10/14/2024 05:11 PM    CREATININE 0.5 04/26/2009 12:20 AM    BUNCREATRAT 10.0 09/20/2023 07:28 PM    CALCIUM 9.2 10/14/2024 05:11 PM     Lab Results   Component Value Date/Time    COLORURINE Lt. Yellow 12/10/2012 09:20 AM    SPECGRAVITY 1.016 12/10/2012 09:20 AM    PHURINE 5.5 12/10/2012 09:20 AM    GLUCOSEUR Negative 12/10/2012 09:20 AM    KETONES Negative 12/10/2012 09:20 AM    PROTEINURIN Negative 12/10/2012 09:20 AM     Lab Results   Component Value Date/Time    HBA1C 5.4 12/07/2022 07:27 AM     RADIOLOGY RESULTS REVIEWED AND INTERPRETED:  Results for orders placed during the hospital encounter of 04/07/21    DX-JOINT SURVEY-FEET SINGLE VIEW    Impression  Unremarkable bilateral PA view if the feet. No radiographic evidence for active inflammatory arthritis at this time.    This exam was performed in an orthopedic clinic of the [facility]. This interpretation is performed in addition to the interpretation by the ordering provider.    Results for orders placed during the hospital encounter of 04/07/21    DX-JOINT SURVEY-HANDS SINGLE VIEW    Impression  Unremarkable examination of hands. No radiographic evidence for active inflammatory arthritis at this time.    This exam was performed in an orthopedic clinic of the [facility]. This interpretation is performed in addition to the interpretation by the ordering provider.    Carlos Price MD  4/7/2021 10:47 AM    All relevant laboratory and imaging results reported on this  note were reviewed and interpreted by me.         Assessment & Plan     Abril Bennett is a 29 y.o. female with history and physical as noted above whose presentation merits the following clinical impressions and recommendations:    1. Psoriatic arthritis (HCC)  Clinically without significant evidence of disease activity on the current regimen of Cimzia 200mg SC every 2 weeks, so no need for escalation of treatment. Given the potential for smoldering disease activity with limited clinical manifestations, need to occasionally reassess markers of disease activity and risk stratification to gauge overall trajectory in response to ongoing treatment.   - CRP QUANTITIVE (NON-CARDIAC); Future  - Sed Rate; Future  - Continue Cimzia 200 mg SC every 2 weeks    2. Plaque psoriasis  Clinically low disease activity that appears to remain well-controlled on the current regimen of Cimzia, so no need for modification of treatment.  - Continue Cimzia 200 mg SC every 2 weeks    3. Cubital tunnel syndrome, bilateral  Symptoms resolved after discontinuation of medications with exception of Cimzia.   - Consider Neurodiagnostics (EEG,EP,EMG/NCS/DBS) as previously ordered if symptoms reappear    4. 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 Initially raised some suspicion for TNF-alpha inhibitor-induced autoimmunity, but seems unlikely as her FARHANA was initially positive in 12/2022 prior to initiation of Cimzia in 1/2023.  - In the context of her psoriatic arthritis, consider hydroxychloroquine 200 mg    5. Vitamin D deficiency  Hypovitaminosis D or deficiency can contribute to diffuse musculoskeletal aches, fatigue, malaise, and risk of decrease in bone density predisposing to osteopenia, especially in the setting of a rheumatic disease.  Patient completed vitamin D 50,000 IU for 12 weeks; patient has stopped vitamin supplements.  - VITAMIN D,25 HYDROXY (DEFICIENCY); Future    6. Immunosuppressed status (HCC)  High risk immunosuppressant use requiring routine monitoring labs prior to every visit to assess for possible side effects including but not limited to myelotoxicity, hepatotoxicity, and opportunistic infections.  - CBC WITH DIFFERENTIAL; Future  - Comp Metabolic Panel; Future  - Need to ascertain age-appropriate vaccines in addition to the ones listed above under immunizations      The above assessment and plan were discussed with the patient who acknowledged understanding of the plan.    FOLLOW-UP: Return in about 6 months (around 10/2/2025) for Short.         Thank you for the opportunity to participate in the care of Abril GutierrezGiulianaJon.    Linnea Joseph M.D.   Internal Medicine PGY3      ATTENDING ATTESTATION:  I personally saw and examined this patient, supervised and discussed the case with the resident who participated in the care of the patient. I have carefully reviewed the note in its entirety, made modifications as deemed appropriate, and agree with its content. I hereby attest that the documentation accurately reflects the history, physical exam, assessment and plan of care for the patient.    John Goodwin MD, MS, FACR  Rheumatologist, [facility] Rheumatology, [facility]   of Clinical Medicine, Department of Internal Medicine  [facility]

## 2025-04-09 ENCOUNTER — OFFICE VISIT (OUTPATIENT)
Dept: MEDICAL GROUP | Facility: PHYSICIAN GROUP | Age: 30
End: 2025-04-09
Payer: COMMERCIAL

## 2025-04-09 VITALS
BODY MASS INDEX: 31.1 KG/M2 | WEIGHT: 158.4 LBS | HEIGHT: 60 IN | TEMPERATURE: 99.4 F | HEART RATE: 66 BPM | OXYGEN SATURATION: 99 % | DIASTOLIC BLOOD PRESSURE: 62 MMHG | SYSTOLIC BLOOD PRESSURE: 106 MMHG

## 2025-04-09 DIAGNOSIS — N92.6 MISSED PERIOD: ICD-10-CM

## 2025-04-09 DIAGNOSIS — N92.6 IRREGULAR MENSES: ICD-10-CM

## 2025-04-09 DIAGNOSIS — H92.09 OTALGIA, UNSPECIFIED LATERALITY: ICD-10-CM

## 2025-04-09 LAB
INT CON NEG: NEGATIVE
INT CON POS: POSITIVE
POC URINE PREGNANCY TEST: NORMAL

## 2025-04-09 PROCEDURE — 81025 URINE PREGNANCY TEST: CPT | Performed by: FAMILY MEDICINE

## 2025-04-09 PROCEDURE — 99213 OFFICE O/P EST LOW 20 MIN: CPT | Performed by: FAMILY MEDICINE

## 2025-04-09 PROCEDURE — 3074F SYST BP LT 130 MM HG: CPT | Performed by: FAMILY MEDICINE

## 2025-04-09 PROCEDURE — 3078F DIAST BP <80 MM HG: CPT | Performed by: FAMILY MEDICINE

## 2025-04-09 ASSESSMENT — FIBROSIS 4 INDEX: FIB4 SCORE: 0.5

## 2025-04-09 ASSESSMENT — PATIENT HEALTH QUESTIONNAIRE - PHQ9: CLINICAL INTERPRETATION OF PHQ2 SCORE: 0

## 2025-04-09 NOTE — PROGRESS NOTES
Verbal consent was acquired by the patient to use [employer] ambient listening note generation during this visit.    Subjective:     HPI:   History of Present Illness  The patient presents for evaluation of otalgia and amenorrhea.    She reports experiencing aural discomfort since Monday, initially presenting as a sensation of aural fullness, which escalated to pain by Tuesday. She attempted self-treatment with unspecified eardrops, which exacerbated her symptoms, leading to increased aural fullness and pressure. She has not experienced any otorrhea. She uses cotton swabs for external ear cleaning and paper towels for internal cleaning. She reports no associated symptoms such as sneezing, conjunctival injection, rhinorrhea, or cough. She also reports no recent swimming activities or submersion of her head in water. She speculates that her symptoms may be due to water entering her ear during a shower.    She is currently not using any form of contraception and expresses a desire to discontinue her medications. Her menstrual cycle has been regular since November, with the exception of one instance where she experienced two menstrual periods within a short span at the end of February 2025 and the first week of March 2025. Her last menstrual period was over a week ago. She has discontinued Lexapro due to its ineffectiveness and associated nausea. She is currently undergoing ketamine treatment and has been medication-free for the past 3 months, with the exception of Cimzia. She had a consultation with her rheumatologist last Wednesday.    Supplemental Information  She believes her scar has healed well.    MEDICATIONS  Current: Cimzia.  Discontinued: Lexapro.    Health Maintenance: Completed    Objective:     Exam:  /62 (BP Location: Left arm, Patient Position: Sitting, BP Cuff Size: Adult)   Pulse 66   Temp 37.4 °C (99.4 °F) (Temporal)   Ht 1.524 m (5')   Wt 71.8 kg (158 lb 6.4 oz)   SpO2 99%   BMI 30.94  kg/m²  Body mass index is 30.94 kg/m².    Physical Exam  Vitals reviewed.   Constitutional:       Appearance: Normal appearance.   HENT:      Head: Normocephalic and atraumatic.      Right Ear: External ear normal. There is no impacted cerumen.      Left Ear: External ear normal. There is no impacted cerumen.      Nose: Nose normal.      Mouth/Throat:      Mouth: Mucous membranes are moist.      Pharynx: Oropharynx is clear.   Eyes:      Extraocular Movements: Extraocular movements intact.      Conjunctiva/sclera: Conjunctivae normal.      Pupils: Pupils are equal, round, and reactive to light.   Cardiovascular:      Rate and Rhythm: Normal rate and regular rhythm.      Pulses: Normal pulses.      Heart sounds: Normal heart sounds. No murmur heard.  Pulmonary:      Effort: Pulmonary effort is normal. No respiratory distress.      Breath sounds: Normal breath sounds. No wheezing.   Abdominal:      General: Abdomen is flat.      Palpations: Abdomen is soft.   Skin:     General: Skin is warm and dry.   Neurological:      Mental Status: She is alert and oriented to person, place, and time.   Psychiatric:         Mood and Affect: Mood normal.         Behavior: Behavior normal.     Effusion noted of the right ear        Results      Assessment & Plan:     1. Otalgia, unspecified laterality  Ear Cerumen Removal      2. Missed period  POC URINE PREGNANCY      3. Irregular menses            Assessment & Plan  1. Otalgia.  Her symptoms do not align with a diagnosis of otitis externa or an allergic reaction. The presence of fluid in the ear suggests a possible effusion, but the absence of erythema indicates that antibiotic drops are not necessary at this time. An ear lavage will be performed to remove any cerumen that may be contributing to her discomfort. She is advised to discontinue the use of the current eardrops and instead use an over-the-counter ear lavage kit or Debrox. Additionally, she is recommended to lie on the  opposite side to facilitate drainage. If there is no improvement in her symptoms following the ear lavage, further evaluation will be considered.    2. Amenorrhea.  Her menstrual cycle appears to be in the process of regulation. A pregnancy test will be conducted today. If she continues to experience amenorrhea, she is advised to return for further evaluation.          Return if symptoms worsen or fail to improve.    Please note that this dictation was created using voice recognition software. I have made every reasonable attempt to correct obvious errors, but I expect that there are errors of grammar and possibly content that I did not discover before finalizing the note.    Mary Ca MD  Family Medicine and Non - Operative Sports Medicine   [facility]

## 2025-04-16 ENCOUNTER — OFFICE VISIT (OUTPATIENT)
Dept: MEDICAL GROUP | Facility: PHYSICIAN GROUP | Age: 30
End: 2025-04-16
Payer: COMMERCIAL

## 2025-04-16 VITALS
HEART RATE: 81 BPM | TEMPERATURE: 99.7 F | BODY MASS INDEX: 30.27 KG/M2 | WEIGHT: 154.2 LBS | HEIGHT: 60 IN | OXYGEN SATURATION: 97 % | SYSTOLIC BLOOD PRESSURE: 100 MMHG | DIASTOLIC BLOOD PRESSURE: 60 MMHG

## 2025-04-16 DIAGNOSIS — H61.23 BILATERAL IMPACTED CERUMEN: ICD-10-CM

## 2025-04-16 PROCEDURE — 3078F DIAST BP <80 MM HG: CPT

## 2025-04-16 PROCEDURE — 69210 REMOVE IMPACTED EAR WAX UNI: CPT | Mod: 50

## 2025-04-16 PROCEDURE — 3074F SYST BP LT 130 MM HG: CPT

## 2025-04-16 ASSESSMENT — FIBROSIS 4 INDEX: FIB4 SCORE: 0.5

## 2025-04-16 NOTE — PROGRESS NOTES
Verbal consent was acquired by the patient to use [employer] ambient listening note generation during this visit  Subjective:     CC:  The encounter diagnosis was Bilateral impacted cerumen [H61.23].    HISTORY OF THE PRESENT ILLNESS: Patient is a 29 y.o. female.   Problem   Bilateral Impacted Cerumen       History of Present Illness  The patient presents for evaluation of right otalgia.    Right Otalgia  - Approximately one week ago, she sought medical attention due to a sensation of aural fullness and significant discomfort in her right ear, suspecting otitis media.  - Initial management included the use of over-the-counter otic drops; however, she continued to experience persistent pain and pressure.  - She reported symptoms of conductive hearing loss and a sensation of fluid in the ear.  - No antibiotics or prescription otic drops were administered.  - The condition was anticipated to resolve spontaneously within a few days.  - The use of otic drops purchased from [employer] was discouraged as it was believed to have exacerbated her symptoms.  - She underwent two aural irrigation procedures, with the second procedure revealing residual fluid in the ear, which was expected to drain naturally over time.  - A friend assisted her in using cerumenolytic drops and performed an additional aural cleaning, but these interventions did not result in any improvement.    Supplemental information: Her usual ear hygiene routine involves using a paper towel for internal cleaning and a cotton swab for the external part, but she perceives an excessive production of cerumen.    ROS:   Review of Systems   HENT:  Positive for congestion and ear pain.    All other systems reviewed and are negative.        Objective:     Exam: /60 (BP Location: Left arm, Patient Position: Sitting, BP Cuff Size: Adult)   Pulse 81   Temp 37.6 °C (99.7 °F) (Temporal)   Ht 1.524 m (5')   Wt 69.9 kg (154 lb 3.2 oz)   SpO2 97%  Body mass index is  30.12 kg/m².    Physical Exam  Constitutional:       General: She is not in acute distress.     Appearance: Normal appearance. She is not ill-appearing or toxic-appearing.   HENT:      Head: Normocephalic.      Right Ear: There is impacted cerumen.      Left Ear: There is impacted cerumen.   Eyes:      Conjunctiva/sclera: Conjunctivae normal.   Pulmonary:      Effort: Pulmonary effort is normal.   Skin:     General: Skin is warm and dry.   Neurological:      General: No focal deficit present.      Mental Status: She is alert and oriented to person, place, and time.   Psychiatric:         Mood and Affect: Mood normal.         Behavior: Behavior normal.           Labs:     Assessment & Plan: Medical Decision Making   29 y.o. female with the following -    Assessment & Plan  1. Right otalgia.  - Symptoms include ear congestion, muffled hearing, and pressure.  - Physical examination revealed significant cerumen blockage in the right ear and slight redness of the eardrum.  BILATERAL CERUMEN IMPACTION- Cerumen was successfully removed from both ears using warm tap water irrigation.  Prescription for antibiotic eardrops will be provided if an infection develops, indicated by persistent pain or drainage.  Procedure: Cerumen Removal  Risks and benefits of procedure discussed with patient.  Cerumen removed with lavage by the APRN. Patient tolerated the procedure well    Post-lavage curette was performed by KENIA Hawkins. Post procedure exam with clear canal and normal TM.    Pt educated about proper care of ear canal. Q-tip cleaning, use of debrox and warm water lavage discussed.          PROCEDURE  Procedure: Bilateral ear irrigation for cerumen removal    All questions were answered and agreement to proceed was given after the following Pre-Procedure details were reviewed:  - Risks and Benefits: Discussed potential discomfort during the procedure and the benefit of improved hearing and relief from pressure.  - Side  effects: Possible temporary discomfort and redness of the ear canal.  - Consent: Verbal consent obtained from the patient.    Intra-Procedure:  - Time-Out: Confirmed the procedure and patient identity.  - Site Preparation: Warm tap water was prepared for irrigation.    Post-Procedure:  - Tolerance Level: Patient tolerated the procedure well.  - Complications: None observed.  - Home Care Instructions: Advised to avoid using Q-tips inside the ear canal and to monitor for signs of infection such as pain or drainage. Provided instructions on proper ear cleaning techniques and prescribed antibiotic ear drops to use if symptoms of infection develop.      Problem List Items Addressed This Visit       Bilateral impacted cerumen       Differential diagnosis, natural history, supportive care, and indications for immediate follow-up discussed.  Shared decision making approach was utilized, and patient is amendable with plan of care.  Patient understands to return to clinic or go to the emergency department if symptoms worsen. All questions and concerns addressed to the best of my knowledge.      Return if symptoms worsen or fail to improve.    Please note that this dictation was created using voice recognition software. I have made every reasonable attempt to correct obvious errors, but I expect that there are errors of grammar and possibly content that I did not discover before finalizing the note.

## 2025-04-21 PROBLEM — H61.23 BILATERAL IMPACTED CERUMEN: Status: ACTIVE | Noted: 2025-04-21

## 2025-06-24 ENCOUNTER — TELEPHONE (OUTPATIENT)
Dept: RHEUMATOLOGY | Facility: MEDICAL CENTER | Age: 30
End: 2025-06-24
Payer: COMMERCIAL

## 2025-06-24 NOTE — TELEPHONE ENCOUNTER
Prior Authorization for Cimzia (2 Syringe) 200MG/ML syringe kit (Quantity: 2, Days: 28) has been submitted via Cover My Meds: Key ([id number])    Insurance: [facility]    Will follow up in 24-48 business hours.

## 2025-06-26 NOTE — TELEPHONE ENCOUNTER
Prior Authorization for Cimzia (2 Syringe) 200MG/ML syringe kit  has been approved for a quantity of 2 , day supply 28    Prior Authorization reference number: [id number]  Insurance: [facility]  Effective dates: 06/26/2025 to 06/26/2026

## 2025-07-17 ENCOUNTER — APPOINTMENT (OUTPATIENT)
Dept: RADIOLOGY | Facility: MEDICAL CENTER | Age: 30
End: 2025-07-17
Attending: EMERGENCY MEDICINE
Payer: COMMERCIAL

## 2025-07-17 ENCOUNTER — HOSPITAL ENCOUNTER (EMERGENCY)
Facility: MEDICAL CENTER | Age: 30
End: 2025-07-17
Attending: EMERGENCY MEDICINE
Payer: COMMERCIAL

## 2025-07-17 VITALS
OXYGEN SATURATION: 97 % | TEMPERATURE: 97.7 F | HEART RATE: 67 BPM | DIASTOLIC BLOOD PRESSURE: 55 MMHG | HEIGHT: 60 IN | SYSTOLIC BLOOD PRESSURE: 99 MMHG | WEIGHT: 143.3 LBS | BODY MASS INDEX: 28.13 KG/M2 | RESPIRATION RATE: 16 BRPM

## 2025-07-17 DIAGNOSIS — O20.0 THREATENED MISCARRIAGE: Primary | ICD-10-CM

## 2025-07-17 DIAGNOSIS — E86.0 DEHYDRATION: ICD-10-CM

## 2025-07-17 LAB
ALBUMIN SERPL BCP-MCNC: 4.1 G/DL (ref 3.2–4.9)
ALBUMIN/GLOB SERPL: 1.3 G/DL
ALP SERPL-CCNC: 57 U/L (ref 30–99)
ALT SERPL-CCNC: 23 U/L (ref 2–50)
ANION GAP SERPL CALC-SCNC: 12 MMOL/L (ref 7–16)
APPEARANCE UR: CLEAR
AST SERPL-CCNC: 21 U/L (ref 12–45)
B-HCG SERPL-ACNC: ABNORMAL MIU/ML (ref 0–5)
BACTERIA #/AREA URNS HPF: NORMAL /HPF
BASOPHILS # BLD AUTO: 0.3 % (ref 0–1.8)
BASOPHILS # BLD: 0.04 K/UL (ref 0–0.12)
BILIRUB SERPL-MCNC: 0.4 MG/DL (ref 0.1–1.5)
BILIRUB UR QL STRIP.AUTO: NEGATIVE
BUN SERPL-MCNC: 4 MG/DL (ref 8–22)
CALCIUM ALBUM COR SERPL-MCNC: 8.9 MG/DL (ref 8.5–10.5)
CALCIUM SERPL-MCNC: 9 MG/DL (ref 8.5–10.5)
CASTS URNS QL MICRO: NORMAL /LPF (ref 0–2)
CHLORIDE SERPL-SCNC: 105 MMOL/L (ref 96–112)
CO2 SERPL-SCNC: 19 MMOL/L (ref 20–33)
COLOR UR: YELLOW
CREAT SERPL-MCNC: 0.51 MG/DL (ref 0.5–1.4)
EKG IMPRESSION: NORMAL
EOSINOPHIL # BLD AUTO: 0.15 K/UL (ref 0–0.51)
EOSINOPHIL NFR BLD: 1.2 % (ref 0–6.9)
EPITHELIAL CELLS 1715: NORMAL /HPF (ref 0–5)
ERYTHROCYTE [DISTWIDTH] IN BLOOD BY AUTOMATED COUNT: 39.2 FL (ref 35.9–50)
GFR SERPLBLD CREATININE-BSD FMLA CKD-EPI: 130 ML/MIN/1.73 M 2
GLOBULIN SER CALC-MCNC: 3.2 G/DL (ref 1.9–3.5)
GLUCOSE SERPL-MCNC: 99 MG/DL (ref 65–99)
GLUCOSE UR STRIP.AUTO-MCNC: NEGATIVE MG/DL
HCT VFR BLD AUTO: 37.2 % (ref 37–47)
HGB BLD-MCNC: 13.2 G/DL (ref 12–16)
IMM GRANULOCYTES # BLD AUTO: 0.05 K/UL (ref 0–0.11)
IMM GRANULOCYTES NFR BLD AUTO: 0.4 % (ref 0–0.9)
KETONES UR STRIP.AUTO-MCNC: NEGATIVE MG/DL
LEUKOCYTE ESTERASE UR QL STRIP.AUTO: NEGATIVE
LIPASE SERPL-CCNC: 20 U/L (ref 11–82)
LYMPHOCYTES # BLD AUTO: 1.91 K/UL (ref 1–4.8)
LYMPHOCYTES NFR BLD: 15.7 % (ref 22–41)
MCH RBC QN AUTO: 30.3 PG (ref 27–33)
MCHC RBC AUTO-ENTMCNC: 35.5 G/DL (ref 32.2–35.5)
MCV RBC AUTO: 85.5 FL (ref 81.4–97.8)
MICRO URNS: ABNORMAL
MONOCYTES # BLD AUTO: 0.75 K/UL (ref 0–0.85)
MONOCYTES NFR BLD AUTO: 6.1 % (ref 0–13.4)
NEUTROPHILS # BLD AUTO: 9.3 K/UL (ref 1.82–7.42)
NEUTROPHILS NFR BLD: 76.3 % (ref 44–72)
NITRITE UR QL STRIP.AUTO: NEGATIVE
NRBC # BLD AUTO: 0 K/UL
NRBC BLD-RTO: 0 /100 WBC (ref 0–0.2)
NUMBER OF RH DOSES IND 8505RD: NORMAL
PH UR STRIP.AUTO: 7 [PH] (ref 5–8)
PLATELET # BLD AUTO: 276 K/UL (ref 164–446)
PMV BLD AUTO: 10.8 FL (ref 9–12.9)
POTASSIUM SERPL-SCNC: 3.5 MMOL/L (ref 3.6–5.5)
PROT SERPL-MCNC: 7.3 G/DL (ref 6–8.2)
PROT UR QL STRIP: NEGATIVE MG/DL
RBC # BLD AUTO: 4.35 M/UL (ref 4.2–5.4)
RBC # URNS HPF: NORMAL /HPF (ref 0–2)
RBC UR QL AUTO: ABNORMAL
RH BLD: NORMAL
SODIUM SERPL-SCNC: 136 MMOL/L (ref 135–145)
SP GR UR STRIP.AUTO: 1
UROBILINOGEN UR STRIP.AUTO-MCNC: 0.2 EU/DL
WBC # BLD AUTO: 12.2 K/UL (ref 4.8–10.8)
WBC #/AREA URNS HPF: NORMAL /HPF

## 2025-07-17 PROCEDURE — 96360 HYDRATION IV INFUSION INIT: CPT

## 2025-07-17 PROCEDURE — 700105 HCHG RX REV CODE 258: Performed by: EMERGENCY MEDICINE

## 2025-07-17 PROCEDURE — 83690 ASSAY OF LIPASE: CPT

## 2025-07-17 PROCEDURE — 85025 COMPLETE CBC W/AUTO DIFF WBC: CPT

## 2025-07-17 PROCEDURE — 99284 EMERGENCY DEPT VISIT MOD MDM: CPT

## 2025-07-17 PROCEDURE — 86901 BLOOD TYPING SEROLOGIC RH(D): CPT

## 2025-07-17 PROCEDURE — 84702 CHORIONIC GONADOTROPIN TEST: CPT

## 2025-07-17 PROCEDURE — 76817 TRANSVAGINAL US OBSTETRIC: CPT

## 2025-07-17 PROCEDURE — 36415 COLL VENOUS BLD VENIPUNCTURE: CPT

## 2025-07-17 PROCEDURE — 93005 ELECTROCARDIOGRAM TRACING: CPT | Mod: TC

## 2025-07-17 PROCEDURE — 80053 COMPREHEN METABOLIC PANEL: CPT

## 2025-07-17 PROCEDURE — 81001 URINALYSIS AUTO W/SCOPE: CPT

## 2025-07-17 PROCEDURE — 93005 ELECTROCARDIOGRAM TRACING: CPT | Mod: TC | Performed by: EMERGENCY MEDICINE

## 2025-07-17 RX ORDER — SODIUM CHLORIDE 9 MG/ML
2000 INJECTION, SOLUTION INTRAVENOUS ONCE
Status: COMPLETED | OUTPATIENT
Start: 2025-07-17 | End: 2025-07-17

## 2025-07-17 RX ADMIN — SODIUM CHLORIDE 2000 ML: 9 INJECTION, SOLUTION INTRAVENOUS at 19:21

## 2025-07-17 ASSESSMENT — FIBROSIS 4 INDEX: FIB4 SCORE: 0.5

## 2025-07-17 ASSESSMENT — PAIN DESCRIPTION - PAIN TYPE: TYPE: ACUTE PAIN

## 2025-07-17 NOTE — ED TRIAGE NOTES
Chief Complaint   Patient presents with    Vaginal Bleeding     Began 1500 today, once while urinating pregnant 8 weeks, , has not received US yet, vomiting with no relief from OTCs, cramping BL lower quadrant, fever/hot flashes at home         Ambulated to triage with family for above complaint.     Past Medical History[1]    Vaginal bleeding protocols ordered. Pt brought to Phleb office for blood draw. Pt educated of triage process and possible wait times. Pt informed to contact staff if status changes or with any developing concerns, pt returned to [facility].     /78   Pulse 70   Temp 36.5 °C (97.7 °F) (Temporal)   Resp 15   Ht 1.524 m (5')   Wt 65 kg (143 lb 4.8 oz)   SpO2 98%   BMI 27.99 kg/m²           [1]   Past Medical History:  Diagnosis Date    Anxiety     Depression

## 2025-07-18 NOTE — ED PROVIDER NOTES
ER Provider Note    Scribed for Lobito Hayward Ii, M.d. by Sophie Crespo. 2025  6:15 PM    Primary Care Provider: Mary Ca M.D.    CHIEF COMPLAINT   Chief Complaint   Patient presents with    Vaginal Bleeding     Began 1500 today, once while urinating pregnant 8 weeks, , has not received US yet, vomiting with no relief from OTCs, cramping BL lower quadrant, fever/hot flashes at home      EXTERNAL RECORDS REVIEWED  Rheumatology office visit 2025.  Treating psoriatic arthritis with Cimzia    HPI/ROS  LIMITATION TO HISTORY   Select: : None  OUTSIDE HISTORIAN(S):  Significant other present at bedside and assisted in providing patient information as detailed below.     Abril Bennett is a 29 y.o. female A0 with a history of psoriatic arthritis who presents to the ED complaining of vaginal bleeding. She is currently 8.5 weeks pregnant. She noticed that after she urinated earlier there was blood on the toilet paper. No heavy bleeding. Cramping, sometimes more at the right side. She reports recent difficulty keeping food and liquids down. She reports associated hematuria, headaches, dizziness, and dehydration. She notes that she usually stays well-hydrated pre pregnancy, but has only been drinking 1-2 water bottles per day now. She is currenly taking Reglan. She adds that she takes prenatal vitamins.     PAST MEDICAL HISTORY  Past Medical History[1]    SURGICAL HISTORY  Past Surgical History[2]    FAMILY HISTORY  Family History   Problem Relation Age of Onset    Lung Disease Mother     Hyperlipidemia Father     Hypertension Father     Diabetes Father     Lung Disease Brother     Diabetes Paternal Uncle     Hypertension Paternal Uncle     Breast Cancer Maternal Grandmother     Diabetes Paternal Grandmother     Hyperlipidemia Paternal Grandmother     Hypertension Paternal Grandmother     Diabetes Paternal Grandfather     Hyperlipidemia Paternal Grandfather     Hypertension  Paternal Grandfather        SOCIAL HISTORY   reports that she has never smoked. She has never used smokeless tobacco. She reports that she does not currently use alcohol. She reports that she does not use drugs.    CURRENT MEDICATIONS  Discharge Medication List as of 7/17/2025  8:26 PM        CONTINUE these medications which have NOT CHANGED    Details   CIMZIA, 2 SYRINGE, 200 MG/ML Prefilled Syringe Kit INJECT 200MG (1 SYRINGE)  SUBCUTANEOUSLY EVERY 2 WEEKS, Disp-2 Each, R-11, Normal             ALLERGIES  Patient has no known allergies.    PHYSICAL EXAM  /60   Pulse 71   Temp 36.5 °C (97.7 °F) (Temporal)   Resp 16   Ht 1.524 m (5')   Wt 65 kg (143 lb 4.8 oz)   SpO2 98%   BMI 27.99 kg/m²   Physical Exam  Vitals and nursing note reviewed.   Constitutional:       Appearance: Normal appearance.   HENT:      Head: Normocephalic.   Eyes:      Pupils: Pupils are equal, round, and reactive to light.   Cardiovascular:      Rate and Rhythm: Normal rate and regular rhythm.   Pulmonary:      Effort: Pulmonary effort is normal.      Breath sounds: Normal breath sounds.   Abdominal:      General: There is no distension.      Tenderness: There is no abdominal tenderness. There is no guarding.   Neurological:      General: No focal deficit present.      Mental Status: She is alert and oriented to person, place, and time.          DIAGNOSTIC STUDIES    EKG/LABS  Results for orders placed or performed during the hospital encounter of 07/17/25   URINALYSIS,CULTURE IF INDICATED    Collection Time: 07/17/25  4:05 PM    Specimen: Urine   Result Value Ref Range    Color Yellow     Character Clear     Specific Gravity 1.004 <1.035    Ph 7.0 5.0 - 8.0    Glucose Negative Negative mg/dL    Ketones Negative Negative mg/dL    Protein Negative Negative mg/dL    Bilirubin Negative Negative    Urobilinogen, Urine 0.2 <=1.0 EU/dL    Nitrite Negative Negative    Leukocyte Esterase Negative Negative    Occult Blood Trace (A) Negative     Micro Urine Req Microscopic    URINE MICROSCOPIC (W/UA)    Collection Time: 07/17/25  4:05 PM   Result Value Ref Range    WBC 0-2 /hpf    RBC 0-2 0 - 2 /hpf    Bacteria None Seen None /hpf    Epithelial Cells 0-2 0 - 5 /hpf    Urine Casts 0-2 0 - 2 /lpf   CBC WITH DIFFERENTIAL    Collection Time: 07/17/25  4:34 PM   Result Value Ref Range    WBC 12.2 (H) 4.8 - 10.8 K/uL    RBC 4.35 4.20 - 5.40 M/uL    Hemoglobin 13.2 12.0 - 16.0 g/dL    Hematocrit 37.2 37.0 - 47.0 %    MCV 85.5 81.4 - 97.8 fL    MCH 30.3 27.0 - 33.0 pg    MCHC 35.5 32.2 - 35.5 g/dL    RDW 39.2 35.9 - 50.0 fL    Platelet Count 276 164 - 446 K/uL    MPV 10.8 9.0 - 12.9 fL    Neutrophils-Polys 76.30 (H) 44.00 - 72.00 %    Lymphocytes 15.70 (L) 22.00 - 41.00 %    Monocytes 6.10 0.00 - 13.40 %    Eosinophils 1.20 0.00 - 6.90 %    Basophils 0.30 0.00 - 1.80 %    Immature Granulocytes 0.40 0.00 - 0.90 %    Nucleated RBC 0.00 0.00 - 0.20 /100 WBC    Neutrophils (Absolute) 9.30 (H) 1.82 - 7.42 K/uL    Lymphs (Absolute) 1.91 1.00 - 4.80 K/uL    Monos (Absolute) 0.75 0.00 - 0.85 K/uL    Eos (Absolute) 0.15 0.00 - 0.51 K/uL    Baso (Absolute) 0.04 0.00 - 0.12 K/uL    Immature Granulocytes (abs) 0.05 0.00 - 0.11 K/uL    NRBC (Absolute) 0.00 K/uL   COMP METABOLIC PANEL    Collection Time: 07/17/25  4:34 PM   Result Value Ref Range    Sodium 136 135 - 145 mmol/L    Potassium 3.5 (L) 3.6 - 5.5 mmol/L    Chloride 105 96 - 112 mmol/L    Co2 19 (L) 20 - 33 mmol/L    Anion Gap 12.0 7.0 - 16.0    Glucose 99 65 - 99 mg/dL    Bun 4 (L) 8 - 22 mg/dL    Creatinine 0.51 0.50 - 1.40 mg/dL    Calcium 9.0 8.5 - 10.5 mg/dL    Correct Calcium 8.9 8.5 - 10.5 mg/dL    AST(SGOT) 21 12 - 45 U/L    ALT(SGPT) 23 2 - 50 U/L    Alkaline Phosphatase 57 30 - 99 U/L    Total Bilirubin 0.4 0.1 - 1.5 mg/dL    Albumin 4.1 3.2 - 4.9 g/dL    Total Protein 7.3 6.0 - 8.2 g/dL    Globulin 3.2 1.9 - 3.5 g/dL    A-G Ratio 1.3 g/dL   LIPASE    Collection Time: 07/17/25  4:34 PM   Result Value Ref  Range    Lipase 20 11 - 82 U/L   HCG QUANTITATIVE    Collection Time: 25  4:34 PM   Result Value Ref Range    Bhcg 391656.0 (H) 0.0 - 5.0 mIU/mL   RH Type for Rhogam from E.D.    Collection Time: 25  4:34 PM   Result Value Ref Range    Emergency Department Rh Typing POS     Number Of Rh Doses Indicated ZERO    ESTIMATED GFR    Collection Time: 25  4:34 PM   Result Value Ref Range    GFR (CKD-EPI) 130 >60 mL/min/1.73 m 2   EKG    Collection Time: 25  7:02 PM   Result Value Ref Range    Report       [facility] Emergency Dept.    Test Date:  2025  Pt Name:    KATI JAMES          Department: ER  MRN:        [id number]                      Room:  Gender:     Female                       Technician: [id number]  :        1995                   Requested By:ER TRIAGE PROTOCOL  Order #:    [id number]                    Reading MD: Lobito Hayward II, MD    Measurements  Intervals                                Axis  Rate:       67                           P:          76  GA:         135                          QRS:        71  QRSD:       85                           T:          55  QT:         406  QTc:        429    Interpretive Statements  Sinus rhythm  Rate normal  Normal intervals  No ST elevation or depression.  Impression: Sinus rhythm EKG  No previous ECG available for comparison  Electronically Signed On 2025 19:02:02 PDT by Lobito Hayward II, MD         I have independently interpreted this EKG    RADIOLOGY/PROCEDURES     Radiologist interpretation:  US-OB 1ST TRIMESTER WITH TRANSVAGINAL (COMBO)   Final Result      1.  Single living intrauterine pregnancy at 8 weeks, 2 days estimated gestational age.   2.  Possible umbilical cord cyst. Attention on follow-up.   3.  Small subchorionic hemorrhage.          COURSE & MEDICAL DECISION MAKING     ASSESSMENT, COURSE AND PLAN  Care Narrative:     6:17 PM This is an emergency department evaluation of a  29 year old woman  approx 8 weeks pregnant, established with LINDA baez here with vaginal bleeding. She also has concerns for vomiting and the prescribed medication she is taking is not working all the time. She does appear dehydrated.  Work up initated at triage. Labs completed and show Bhcg >137,000, mild dehydration and borderline potassium. Rh postiive. Ultrasound pending. Ddx includes ectopic pregnancy vs subchorionic hemorrhage vs threatened miscarriage.     7:35 PM - Patient was reevaluated at bedside. Discussed lab and radiology results with the patient and recommended follow up with OB GYN. Will receive 2L IV fluid bolus for dehydration prior to discharge.  Patient had the opportunity to ask any questions. The plan for discharge was discussed with them and they were told to return for any new or worsening symptoms. She was also informed of the plans for follow up. Patient is understanding and agreeable to the plan for discharge.     PROBLEM LIST  #Threatened miscarriage, subchorionic hemorrhage   -follow up with OBGYN as scheduled next week    #Dehydration 2/2 nausea and vomiting   -treated with antiemetics and IV fluids and she improved.     DISPOSITION AND DISCUSSIONS  I have discussed management of the patient with the following physicians and JEFFREY's:  None    Discussion of management with other QHP or appropriate source(s): None     Barriers to care at this time, including but not limited to: None.     Decision tools and prescription drugs considered including, but not limited to: None.    The patient will return for new or worsening symptoms and is stable at the time of discharge.    The patient is referred to a primary physician for blood pressure management, diabetic screening, and for all other preventative health concerns.    DISPOSITION:  Patient will be discharged home in stable condition.    FOLLOW UP:  LINDA BAEZ  [address]  [place] [address]  [phone]    Go  to [facility] as scheduled    [facility], Emergency Dept  [address] [address] [phone]    heavy vaginal bleeding, fevers, unable to stop vomiting come back to the ER      FINAL DIAGNOSIS  1. Threatened miscarriage    2. Dehydration       Sophie MEHTA (Scribe), am scribing for, and in the presence of, CHERELLE Hobbs II.    Electronically signed by: Sophie Crespo (Scribe), 7/17/2025    Lobito MEHTA II, M* personally performed the services described in this documentation, as scribed by Sophie Crespo in my presence, and it is both accurate and complete.      The note accurately reflects work and decisions made by me.  Lobito Hayward II, M.D.  7/18/2025  12:41 AM           [1]   Past Medical History:  Diagnosis Date    Anxiety     Depression    [2]   Past Surgical History:  Procedure Laterality Date    APPENDECTOMY  2010

## 2025-07-30 DIAGNOSIS — O21.0 HYPEREMESIS GRAVIDARUM: Primary | ICD-10-CM

## 2025-07-30 RX ORDER — ONDANSETRON 2 MG/ML
4 INJECTION INTRAMUSCULAR; INTRAVENOUS ONCE
OUTPATIENT
Start: 2025-07-31 | End: 2025-07-31

## 2025-08-13 ENCOUNTER — OUTPATIENT INFUSION SERVICES (OUTPATIENT)
Dept: ONCOLOGY | Facility: MEDICAL CENTER | Age: 30
End: 2025-08-13
Attending: OBSTETRICS & GYNECOLOGY
Payer: COMMERCIAL

## 2025-08-13 ENCOUNTER — HOSPITAL ENCOUNTER (OUTPATIENT)
Dept: LAB | Facility: MEDICAL CENTER | Age: 30
End: 2025-08-13
Attending: OBSTETRICS & GYNECOLOGY
Payer: COMMERCIAL

## 2025-08-13 VITALS
TEMPERATURE: 98 F | OXYGEN SATURATION: 97 % | RESPIRATION RATE: 18 BRPM | HEART RATE: 77 BPM | BODY MASS INDEX: 29 KG/M2 | HEIGHT: 60 IN | WEIGHT: 147.71 LBS | SYSTOLIC BLOOD PRESSURE: 107 MMHG | DIASTOLIC BLOOD PRESSURE: 56 MMHG

## 2025-08-13 DIAGNOSIS — O21.0 HYPEREMESIS GRAVIDARUM: Primary | ICD-10-CM

## 2025-08-13 LAB
25(OH)D3 SERPL-MCNC: 33 NG/ML (ref 30–100)
ABO GROUP BLD: NORMAL
BASOPHILS # BLD AUTO: 0.3 % (ref 0–1.8)
BASOPHILS # BLD: 0.03 K/UL (ref 0–0.12)
BLD GP AB SCN SERPL QL: NORMAL
EOSINOPHIL # BLD AUTO: 0.05 K/UL (ref 0–0.51)
EOSINOPHIL NFR BLD: 0.5 % (ref 0–6.9)
ERYTHROCYTE [DISTWIDTH] IN BLOOD BY AUTOMATED COUNT: 42.9 FL (ref 35.9–50)
HBV SURFACE AG SER QL: ABNORMAL
HCT VFR BLD AUTO: 38.9 % (ref 37–47)
HCV AB SER QL: NORMAL
HGB BLD-MCNC: 13.5 G/DL (ref 12–16)
HIV 1+2 AB+HIV1 P24 AG SERPL QL IA: NORMAL
IMM GRANULOCYTES # BLD AUTO: 0.08 K/UL (ref 0–0.11)
IMM GRANULOCYTES NFR BLD AUTO: 0.8 % (ref 0–0.9)
LYMPHOCYTES # BLD AUTO: 1.19 K/UL (ref 1–4.8)
LYMPHOCYTES NFR BLD: 12 % (ref 22–41)
MCH RBC QN AUTO: 30.3 PG (ref 27–33)
MCHC RBC AUTO-ENTMCNC: 34.7 G/DL (ref 32.2–35.5)
MCV RBC AUTO: 87.2 FL (ref 81.4–97.8)
MONOCYTES # BLD AUTO: 0.43 K/UL (ref 0–0.85)
MONOCYTES NFR BLD AUTO: 4.3 % (ref 0–13.4)
NEUTROPHILS # BLD AUTO: 8.12 K/UL (ref 1.82–7.42)
NEUTROPHILS NFR BLD: 82.1 % (ref 44–72)
NRBC # BLD AUTO: 0 K/UL
NRBC BLD-RTO: 0 /100 WBC (ref 0–0.2)
PLATELET # BLD AUTO: 277 K/UL (ref 164–446)
PMV BLD AUTO: 11.7 FL (ref 9–12.9)
RBC # BLD AUTO: 4.46 M/UL (ref 4.2–5.4)
RH BLD: NORMAL
RUBV AB SER QL: 102 IU/ML
T PALLIDUM AB SER QL IA: ABNORMAL
TSH SERPL DL<=0.005 MIU/L-ACNC: 4.08 UIU/ML (ref 0.38–5.33)
WBC # BLD AUTO: 9.9 K/UL (ref 4.8–10.8)

## 2025-08-13 PROCEDURE — 84443 ASSAY THYROID STIM HORMONE: CPT

## 2025-08-13 PROCEDURE — 86900 BLOOD TYPING SEROLOGIC ABO: CPT

## 2025-08-13 PROCEDURE — 87340 HEPATITIS B SURFACE AG IA: CPT

## 2025-08-13 PROCEDURE — 86803 HEPATITIS C AB TEST: CPT

## 2025-08-13 PROCEDURE — 700101 HCHG RX REV CODE 250: Performed by: OBSTETRICS & GYNECOLOGY

## 2025-08-13 PROCEDURE — 87086 URINE CULTURE/COLONY COUNT: CPT

## 2025-08-13 PROCEDURE — 86780 TREPONEMA PALLIDUM: CPT

## 2025-08-13 PROCEDURE — 86850 RBC ANTIBODY SCREEN: CPT

## 2025-08-13 PROCEDURE — 36415 COLL VENOUS BLD VENIPUNCTURE: CPT

## 2025-08-13 PROCEDURE — 700105 HCHG RX REV CODE 258: Performed by: OBSTETRICS & GYNECOLOGY

## 2025-08-13 PROCEDURE — 82306 VITAMIN D 25 HYDROXY: CPT

## 2025-08-13 PROCEDURE — 86762 RUBELLA ANTIBODY: CPT

## 2025-08-13 PROCEDURE — 86235 NUCLEAR ANTIGEN ANTIBODY: CPT

## 2025-08-13 PROCEDURE — 96366 THER/PROPH/DIAG IV INF ADDON: CPT

## 2025-08-13 PROCEDURE — 86901 BLOOD TYPING SEROLOGIC RH(D): CPT

## 2025-08-13 PROCEDURE — 87389 HIV-1 AG W/HIV-1&-2 AB AG IA: CPT

## 2025-08-13 PROCEDURE — 85025 COMPLETE CBC W/AUTO DIFF WBC: CPT

## 2025-08-13 RX ORDER — ONDANSETRON 4 MG/1
4 TABLET, ORALLY DISINTEGRATING ORAL EVERY 6 HOURS PRN
COMMUNITY

## 2025-08-13 RX ORDER — ONDANSETRON 2 MG/ML
4 INJECTION INTRAMUSCULAR; INTRAVENOUS ONCE
Status: CANCELLED | OUTPATIENT
Start: 2025-08-20 | End: 2025-08-20

## 2025-08-13 RX ADMIN — ASCORBIC ACID, VITAMIN A PALMITATE, CHOLECALCIFEROL, THIAMINE HYDROCHLORIDE, RIBOFLAVIN-5 PHOSPHATE SODIUM, PYRIDOXINE HYDROCHLORIDE, NIACINAMIDE, DEXPANTHENOL, ALPHA-TOCOPHEROL ACETATE, VITAMIN K1, FOLIC ACID, BIOTIN, CYANOCOBALAMIN: 200; 3300; 200; 6; 3.6; 6; 40; 15; 10; 150; 600; 60; 5 INJECTION, SOLUTION INTRAVENOUS at 08:32

## 2025-08-13 ASSESSMENT — FIBROSIS 4 INDEX: FIB4 SCORE: 0.43

## 2025-08-15 LAB
BACTERIA UR CULT: NORMAL
ENA SS-A 60KD AB SER-ACNC: 0 AU/ML (ref 0–40)
ENA SS-A IGG SER QL: 1 AU/ML (ref 0–40)
ENA SS-B IGG SER IA-ACNC: 0 AU/ML (ref 0–40)
SIGNIFICANT IND 70042: NORMAL
SITE SITE: NORMAL
SOURCE SOURCE: NORMAL

## 2025-08-20 ENCOUNTER — OUTPATIENT INFUSION SERVICES (OUTPATIENT)
Dept: ONCOLOGY | Facility: MEDICAL CENTER | Age: 30
End: 2025-08-20
Attending: OBSTETRICS & GYNECOLOGY
Payer: COMMERCIAL

## 2025-08-20 VITALS
WEIGHT: 146.61 LBS | TEMPERATURE: 98.4 F | DIASTOLIC BLOOD PRESSURE: 59 MMHG | BODY MASS INDEX: 28.78 KG/M2 | RESPIRATION RATE: 16 BRPM | OXYGEN SATURATION: 94 % | SYSTOLIC BLOOD PRESSURE: 110 MMHG | HEIGHT: 60 IN | HEART RATE: 78 BPM

## 2025-08-20 DIAGNOSIS — O21.0 HYPEREMESIS GRAVIDARUM: Primary | ICD-10-CM

## 2025-08-20 PROCEDURE — 700105 HCHG RX REV CODE 258: Performed by: OBSTETRICS & GYNECOLOGY

## 2025-08-20 PROCEDURE — 96375 TX/PRO/DX INJ NEW DRUG ADDON: CPT

## 2025-08-20 PROCEDURE — 700111 HCHG RX REV CODE 636 W/ 250 OVERRIDE (IP): Mod: JZ | Performed by: OBSTETRICS & GYNECOLOGY

## 2025-08-20 PROCEDURE — 96365 THER/PROPH/DIAG IV INF INIT: CPT

## 2025-08-20 PROCEDURE — 700101 HCHG RX REV CODE 250: Performed by: OBSTETRICS & GYNECOLOGY

## 2025-08-20 PROCEDURE — 96366 THER/PROPH/DIAG IV INF ADDON: CPT

## 2025-08-20 RX ORDER — ONDANSETRON 2 MG/ML
4 INJECTION INTRAMUSCULAR; INTRAVENOUS ONCE
OUTPATIENT
Start: 2025-08-27 | End: 2025-08-27

## 2025-08-20 RX ORDER — ONDANSETRON 2 MG/ML
4 INJECTION INTRAMUSCULAR; INTRAVENOUS ONCE
Status: COMPLETED | OUTPATIENT
Start: 2025-08-20 | End: 2025-08-20

## 2025-08-20 RX ADMIN — ONDANSETRON 4 MG: 2 INJECTION INTRAMUSCULAR; INTRAVENOUS at 11:33

## 2025-08-20 RX ADMIN — ASCORBIC ACID, VITAMIN A PALMITATE, CHOLECALCIFEROL, THIAMINE HYDROCHLORIDE, RIBOFLAVIN-5 PHOSPHATE SODIUM, PYRIDOXINE HYDROCHLORIDE, NIACINAMIDE, DEXPANTHENOL, ALPHA-TOCOPHEROL ACETATE, VITAMIN K1, FOLIC ACID, BIOTIN, CYANOCOBALAMIN: 200; 3300; 200; 6; 3.6; 6; 40; 15; 10; 150; 600; 60; 5 INJECTION, SOLUTION INTRAVENOUS at 11:52

## 2025-08-20 ASSESSMENT — FIBROSIS 4 INDEX: FIB4 SCORE: 0.43
